# Patient Record
Sex: FEMALE | Race: WHITE | NOT HISPANIC OR LATINO | Employment: OTHER | URBAN - METROPOLITAN AREA
[De-identification: names, ages, dates, MRNs, and addresses within clinical notes are randomized per-mention and may not be internally consistent; named-entity substitution may affect disease eponyms.]

---

## 2019-07-07 ENCOUNTER — APPOINTMENT (OUTPATIENT)
Dept: RADIOLOGY | Facility: MEDICAL CENTER | Age: 84
DRG: 602 | End: 2019-07-07
Attending: INTERNAL MEDICINE
Payer: MEDICARE

## 2019-07-07 ENCOUNTER — HOSPITAL ENCOUNTER (INPATIENT)
Facility: MEDICAL CENTER | Age: 84
LOS: 9 days | DRG: 602 | End: 2019-07-16
Attending: EMERGENCY MEDICINE | Admitting: INTERNAL MEDICINE
Payer: MEDICARE

## 2019-07-07 ENCOUNTER — APPOINTMENT (OUTPATIENT)
Dept: RADIOLOGY | Facility: MEDICAL CENTER | Age: 84
DRG: 602 | End: 2019-07-07
Attending: EMERGENCY MEDICINE
Payer: MEDICARE

## 2019-07-07 DIAGNOSIS — L97.329 CHRONIC ULCER OF LEFT ANKLE, UNSPECIFIED ULCER STAGE (HCC): ICD-10-CM

## 2019-07-07 DIAGNOSIS — I10 ESSENTIAL HYPERTENSION: ICD-10-CM

## 2019-07-07 DIAGNOSIS — I73.9 PAD (PERIPHERAL ARTERY DISEASE) (HCC): ICD-10-CM

## 2019-07-07 DIAGNOSIS — I89.1 LYMPHANGITIS: ICD-10-CM

## 2019-07-07 DIAGNOSIS — L03.116 CELLULITIS OF LEFT LOWER EXTREMITY: ICD-10-CM

## 2019-07-07 PROBLEM — L97.309 CHRONIC ULCER OF ANKLE (HCC): Status: ACTIVE | Noted: 2019-07-07

## 2019-07-07 LAB
ALBUMIN SERPL BCP-MCNC: 3.1 G/DL (ref 3.2–4.9)
ALBUMIN/GLOB SERPL: 1 G/DL
ALP SERPL-CCNC: 106 U/L (ref 30–99)
ALT SERPL-CCNC: 15 U/L (ref 2–50)
ANION GAP SERPL CALC-SCNC: 8 MMOL/L (ref 0–11.9)
APPEARANCE UR: CLEAR
APTT PPP: 36.6 SEC (ref 24.7–36)
AST SERPL-CCNC: 19 U/L (ref 12–45)
BACTERIA #/AREA URNS HPF: NEGATIVE /HPF
BASOPHILS # BLD AUTO: 0.3 % (ref 0–1.8)
BASOPHILS # BLD: 0.02 K/UL (ref 0–0.12)
BILIRUB SERPL-MCNC: 0.6 MG/DL (ref 0.1–1.5)
BILIRUB UR QL STRIP.AUTO: NEGATIVE
BUN SERPL-MCNC: 28 MG/DL (ref 8–22)
CALCIUM SERPL-MCNC: 9.1 MG/DL (ref 8.5–10.5)
CHLORIDE SERPL-SCNC: 101 MMOL/L (ref 96–112)
CO2 SERPL-SCNC: 23 MMOL/L (ref 20–33)
COLOR UR: YELLOW
CREAT SERPL-MCNC: 1.13 MG/DL (ref 0.5–1.4)
EKG IMPRESSION: NORMAL
EOSINOPHIL # BLD AUTO: 0.01 K/UL (ref 0–0.51)
EOSINOPHIL NFR BLD: 0.1 % (ref 0–6.9)
EPI CELLS #/AREA URNS HPF: NEGATIVE /HPF
ERYTHROCYTE [DISTWIDTH] IN BLOOD BY AUTOMATED COUNT: 43.1 FL (ref 35.9–50)
GLOBULIN SER CALC-MCNC: 3 G/DL (ref 1.9–3.5)
GLUCOSE SERPL-MCNC: 89 MG/DL (ref 65–99)
GLUCOSE UR STRIP.AUTO-MCNC: NEGATIVE MG/DL
GRAN CASTS #/AREA URNS LPF: ABNORMAL /LPF
HCT VFR BLD AUTO: 35.2 % (ref 37–47)
HGB BLD-MCNC: 11.5 G/DL (ref 12–16)
HYALINE CASTS #/AREA URNS LPF: ABNORMAL /LPF
IMM GRANULOCYTES # BLD AUTO: 0.04 K/UL (ref 0–0.11)
IMM GRANULOCYTES NFR BLD AUTO: 0.5 % (ref 0–0.9)
INR PPP: 1 (ref 0.87–1.13)
KETONES UR STRIP.AUTO-MCNC: NEGATIVE MG/DL
LACTATE BLD-SCNC: 1 MMOL/L (ref 0.5–2)
LACTATE BLD-SCNC: 1.4 MMOL/L (ref 0.5–2)
LACTATE BLD-SCNC: 1.5 MMOL/L (ref 0.5–2)
LEUKOCYTE ESTERASE UR QL STRIP.AUTO: ABNORMAL
LYMPHOCYTES # BLD AUTO: 0.94 K/UL (ref 1–4.8)
LYMPHOCYTES NFR BLD: 11.9 % (ref 22–41)
MCH RBC QN AUTO: 26.5 PG (ref 27–33)
MCHC RBC AUTO-ENTMCNC: 32.7 G/DL (ref 33.6–35)
MCV RBC AUTO: 81.1 FL (ref 81.4–97.8)
MICRO URNS: ABNORMAL
MONOCYTES # BLD AUTO: 0.55 K/UL (ref 0–0.85)
MONOCYTES NFR BLD AUTO: 7 % (ref 0–13.4)
NEUTROPHILS # BLD AUTO: 6.33 K/UL (ref 2–7.15)
NEUTROPHILS NFR BLD: 80.2 % (ref 44–72)
NITRITE UR QL STRIP.AUTO: NEGATIVE
NRBC # BLD AUTO: 0 K/UL
NRBC BLD-RTO: 0 /100 WBC
PH UR STRIP.AUTO: 5.5 [PH]
PLATELET # BLD AUTO: 177 K/UL (ref 164–446)
PMV BLD AUTO: 10.2 FL (ref 9–12.9)
POTASSIUM SERPL-SCNC: 3.4 MMOL/L (ref 3.6–5.5)
PROT SERPL-MCNC: 6.1 G/DL (ref 6–8.2)
PROT UR QL STRIP: 30 MG/DL
PROTHROMBIN TIME: 13.4 SEC (ref 12–14.6)
RBC # BLD AUTO: 4.34 M/UL (ref 4.2–5.4)
RBC # URNS HPF: ABNORMAL /HPF
RBC UR QL AUTO: ABNORMAL
RENAL EPI CELLS #/AREA URNS HPF: ABNORMAL /HPF
SODIUM SERPL-SCNC: 132 MMOL/L (ref 135–145)
SP GR UR STRIP.AUTO: 1.02
TROPONIN I SERPL-MCNC: 0.03 NG/ML (ref 0–0.04)
UROBILINOGEN UR STRIP.AUTO-MCNC: 0.2 MG/DL
WBC # BLD AUTO: 7.9 K/UL (ref 4.8–10.8)
WBC #/AREA URNS HPF: ABNORMAL /HPF

## 2019-07-07 PROCEDURE — 71045 X-RAY EXAM CHEST 1 VIEW: CPT

## 2019-07-07 PROCEDURE — 96367 TX/PROPH/DG ADDL SEQ IV INF: CPT

## 2019-07-07 PROCEDURE — 81001 URINALYSIS AUTO W/SCOPE: CPT

## 2019-07-07 PROCEDURE — 85025 COMPLETE CBC W/AUTO DIFF WBC: CPT

## 2019-07-07 PROCEDURE — 83605 ASSAY OF LACTIC ACID: CPT | Mod: 91

## 2019-07-07 PROCEDURE — 93005 ELECTROCARDIOGRAM TRACING: CPT | Performed by: EMERGENCY MEDICINE

## 2019-07-07 PROCEDURE — 85730 THROMBOPLASTIN TIME PARTIAL: CPT

## 2019-07-07 PROCEDURE — 96365 THER/PROPH/DIAG IV INF INIT: CPT

## 2019-07-07 PROCEDURE — 770021 HCHG ROOM/CARE - ISO PRIVATE

## 2019-07-07 PROCEDURE — 700105 HCHG RX REV CODE 258: Performed by: EMERGENCY MEDICINE

## 2019-07-07 PROCEDURE — 700111 HCHG RX REV CODE 636 W/ 250 OVERRIDE (IP): Performed by: EMERGENCY MEDICINE

## 2019-07-07 PROCEDURE — 87040 BLOOD CULTURE FOR BACTERIA: CPT

## 2019-07-07 PROCEDURE — 96366 THER/PROPH/DIAG IV INF ADDON: CPT

## 2019-07-07 PROCEDURE — 99223 1ST HOSP IP/OBS HIGH 75: CPT | Mod: AI | Performed by: INTERNAL MEDICINE

## 2019-07-07 PROCEDURE — 87086 URINE CULTURE/COLONY COUNT: CPT

## 2019-07-07 PROCEDURE — 93922 UPR/L XTREMITY ART 2 LEVELS: CPT

## 2019-07-07 PROCEDURE — 99285 EMERGENCY DEPT VISIT HI MDM: CPT

## 2019-07-07 PROCEDURE — 85610 PROTHROMBIN TIME: CPT

## 2019-07-07 PROCEDURE — 770006 HCHG ROOM/CARE - MED/SURG/GYN SEMI*

## 2019-07-07 PROCEDURE — 36415 COLL VENOUS BLD VENIPUNCTURE: CPT

## 2019-07-07 PROCEDURE — 700105 HCHG RX REV CODE 258: Performed by: INTERNAL MEDICINE

## 2019-07-07 PROCEDURE — 80053 COMPREHEN METABOLIC PANEL: CPT

## 2019-07-07 PROCEDURE — 700102 HCHG RX REV CODE 250 W/ 637 OVERRIDE(OP): Performed by: INTERNAL MEDICINE

## 2019-07-07 PROCEDURE — 73610 X-RAY EXAM OF ANKLE: CPT | Mod: LT

## 2019-07-07 PROCEDURE — 84484 ASSAY OF TROPONIN QUANT: CPT

## 2019-07-07 PROCEDURE — 700111 HCHG RX REV CODE 636 W/ 250 OVERRIDE (IP): Performed by: INTERNAL MEDICINE

## 2019-07-07 PROCEDURE — 93925 LOWER EXTREMITY STUDY: CPT

## 2019-07-07 PROCEDURE — A9270 NON-COVERED ITEM OR SERVICE: HCPCS | Performed by: INTERNAL MEDICINE

## 2019-07-07 RX ORDER — AMLODIPINE BESYLATE 10 MG/1
10 TABLET ORAL DAILY
Status: DISCONTINUED | OUTPATIENT
Start: 2019-07-07 | End: 2019-07-16 | Stop reason: HOSPADM

## 2019-07-07 RX ORDER — SODIUM CHLORIDE 9 MG/ML
INJECTION, SOLUTION INTRAVENOUS CONTINUOUS
Status: DISCONTINUED | OUTPATIENT
Start: 2019-07-07 | End: 2019-07-09

## 2019-07-07 RX ORDER — AMOXICILLIN 250 MG
2 CAPSULE ORAL 2 TIMES DAILY
Status: DISCONTINUED | OUTPATIENT
Start: 2019-07-07 | End: 2019-07-16 | Stop reason: HOSPADM

## 2019-07-07 RX ORDER — LACTOBACILLUS RHAMNOSUS GG 10B CELL
1 CAPSULE ORAL
Status: DISCONTINUED | OUTPATIENT
Start: 2019-07-08 | End: 2019-07-16 | Stop reason: HOSPADM

## 2019-07-07 RX ORDER — OXYCODONE HYDROCHLORIDE 5 MG/1
5 TABLET ORAL
Status: DISCONTINUED | OUTPATIENT
Start: 2019-07-07 | End: 2019-07-10

## 2019-07-07 RX ORDER — BISACODYL 10 MG
10 SUPPOSITORY, RECTAL RECTAL
Status: DISCONTINUED | OUTPATIENT
Start: 2019-07-07 | End: 2019-07-16 | Stop reason: HOSPADM

## 2019-07-07 RX ORDER — CLOPIDOGREL BISULFATE 75 MG/1
75 TABLET ORAL DAILY
COMMUNITY

## 2019-07-07 RX ORDER — CLOPIDOGREL BISULFATE 75 MG/1
75 TABLET ORAL DAILY
Status: DISCONTINUED | OUTPATIENT
Start: 2019-07-07 | End: 2019-07-16 | Stop reason: HOSPADM

## 2019-07-07 RX ORDER — HYDROXYZINE HYDROCHLORIDE 25 MG/1
25 TABLET, FILM COATED ORAL 3 TIMES DAILY PRN
COMMUNITY
End: 2019-07-07

## 2019-07-07 RX ORDER — SODIUM CHLORIDE 9 MG/ML
500 INJECTION, SOLUTION INTRAVENOUS
Status: DISCONTINUED | OUTPATIENT
Start: 2019-07-07 | End: 2019-07-16 | Stop reason: HOSPADM

## 2019-07-07 RX ORDER — HEPARIN SODIUM 5000 [USP'U]/ML
5000 INJECTION, SOLUTION INTRAVENOUS; SUBCUTANEOUS EVERY 8 HOURS
Status: DISCONTINUED | OUTPATIENT
Start: 2019-07-07 | End: 2019-07-10

## 2019-07-07 RX ORDER — OXYCODONE HYDROCHLORIDE 5 MG/1
2.5 TABLET ORAL
Status: DISCONTINUED | OUTPATIENT
Start: 2019-07-07 | End: 2019-07-10

## 2019-07-07 RX ORDER — AMLODIPINE BESYLATE 10 MG/1
10 TABLET ORAL DAILY
COMMUNITY

## 2019-07-07 RX ORDER — ACETAMINOPHEN 325 MG/1
650 TABLET ORAL EVERY 6 HOURS PRN
Status: DISCONTINUED | OUTPATIENT
Start: 2019-07-07 | End: 2019-07-16 | Stop reason: HOSPADM

## 2019-07-07 RX ORDER — HYDROMORPHONE HYDROCHLORIDE 1 MG/ML
0.25 INJECTION, SOLUTION INTRAMUSCULAR; INTRAVENOUS; SUBCUTANEOUS
Status: DISCONTINUED | OUTPATIENT
Start: 2019-07-07 | End: 2019-07-10

## 2019-07-07 RX ORDER — POLYETHYLENE GLYCOL 3350 17 G/17G
1 POWDER, FOR SOLUTION ORAL
Status: DISCONTINUED | OUTPATIENT
Start: 2019-07-07 | End: 2019-07-16 | Stop reason: HOSPADM

## 2019-07-07 RX ADMIN — HEPARIN SODIUM 5000 UNITS: 5000 INJECTION, SOLUTION INTRAVENOUS; SUBCUTANEOUS at 17:50

## 2019-07-07 RX ADMIN — AMPICILLIN AND SULBACTAM 3 G: 1; 2 INJECTION, POWDER, FOR SOLUTION INTRAMUSCULAR; INTRAVENOUS at 21:46

## 2019-07-07 RX ADMIN — VANCOMYCIN HYDROCHLORIDE 1500 MG: 500 INJECTION, POWDER, LYOPHILIZED, FOR SOLUTION INTRAVENOUS at 13:55

## 2019-07-07 RX ADMIN — AMLODIPINE BESYLATE 10 MG: 10 TABLET ORAL at 17:50

## 2019-07-07 RX ADMIN — AMPICILLIN AND SULBACTAM 3 G: 1; 2 INJECTION, POWDER, FOR SOLUTION INTRAMUSCULAR; INTRAVENOUS at 13:20

## 2019-07-07 RX ADMIN — SODIUM CHLORIDE: 9 INJECTION, SOLUTION INTRAVENOUS at 17:51

## 2019-07-07 RX ADMIN — CLOPIDOGREL BISULFATE 75 MG: 75 TABLET ORAL at 17:50

## 2019-07-07 ASSESSMENT — ENCOUNTER SYMPTOMS
SHORTNESS OF BREATH: 0
EYE REDNESS: 0
BLOOD IN STOOL: 0
FEVER: 1
PALPITATIONS: 0
MYALGIAS: 1
VOMITING: 0
NERVOUS/ANXIOUS: 0
LOSS OF CONSCIOUSNESS: 0
WEAKNESS: 1
NAUSEA: 0
HEMOPTYSIS: 0
SEIZURES: 0
TREMORS: 0
FALLS: 0
FOCAL WEAKNESS: 0
DIARRHEA: 0
COUGH: 0
WHEEZING: 0
HEADACHES: 0
CHILLS: 1
DIZZINESS: 0
EYE PAIN: 0
CONSTIPATION: 0
ABDOMINAL PAIN: 0
INSOMNIA: 0

## 2019-07-07 ASSESSMENT — COPD QUESTIONNAIRES
HAVE YOU SMOKED AT LEAST 100 CIGARETTES IN YOUR ENTIRE LIFE: NO/DON'T KNOW
IN THE PAST 12 MONTHS DO YOU DO LESS THAN YOU USED TO BECAUSE OF YOUR BREATHING PROBLEMS: DISAGREE/UNSURE
COPD SCREENING SCORE: 2
DURING THE PAST 4 WEEKS HOW MUCH DID YOU FEEL SHORT OF BREATH: NONE/LITTLE OF THE TIME
DO YOU EVER COUGH UP ANY MUCUS OR PHLEGM?: NO/ONLY WITH OCCASIONAL COLDS OR INFECTIONS

## 2019-07-07 ASSESSMENT — LIFESTYLE VARIABLES
EVER_SMOKED: NEVER
ALCOHOL_USE: NO

## 2019-07-07 ASSESSMENT — PATIENT HEALTH QUESTIONNAIRE - PHQ9
2. FEELING DOWN, DEPRESSED, IRRITABLE, OR HOPELESS: NOT AT ALL
SUM OF ALL RESPONSES TO PHQ9 QUESTIONS 1 AND 2: 0
1. LITTLE INTEREST OR PLEASURE IN DOING THINGS: NOT AT ALL

## 2019-07-07 NOTE — ED TRIAGE NOTES
Pt BIB [place], transfer from [facility] in [place], c/o wound infection to bilat ankles, redness and swelling spreading up left leg to left thigh, pt reports increasing pain and increasing fatigue, pt arrives awake, alert, appropriate, NAD, HR and O2 sat WNL, BP 94/45, pt in gown, on monitor, chart up for ERP

## 2019-07-07 NOTE — ED NOTES
Pt assisted to bed pan, pt unable to urinate at this time, pt encouraged to call when ready to urinate, pt boosted up in bed in bed, knees elevated, no other needs at this time

## 2019-07-07 NOTE — PROGRESS NOTES
"Pharmacy Kinetics 85 y.o. female on vancomycin day # 1 2019    Received Vancomycin 1,500 mg IV loading dose at 13:55 PM    Indication for Treatment: SSTI of LLE     Pertinent history per medical record: Admitted on 2019 for cellulitis of the left lower extremity.     Shantelle Mata is an 85 y.o. female who presented to the ER with erythema, swelling and pain in her left leg. She was transferred from an OSH in [place], were she received IV clindamycin and azithromycin PTA. The patient reported chills and a fever.     Other antibiotics: ampicillin-sulbactam 3g IV Q12h (renally-dosed)    Allergies: Cephalexin [keflex]; Sulfa drugs; and Sulfamethoxazole w-trimethoprim     List concerns for renal function: age, low albumin, BUN/SCr ratio > 20:1    Pertinent cultures to date:   19: Blood culture - in process  19: Blood culture - in process    MRSA nares swab if pneumonia is a concern: n-a    Recent Labs      19   1255   WBC  7.9   NEUTSPOLYS  80.20*     Recent Labs      19   1255   BUN  28*   CREATININE  1.13   ALBUMIN  3.1*     /46   Pulse 69   Temp 36.9 °C (98.4 °F) (Temporal)   Resp (!) 22   Ht 1.575 m (5' 2\")   Wt 58.5 kg (129 lb)   SpO2 96%  Temp (24hrs), Av.9 °C (98.4 °F), Min:36.9 °C (98.4 °F), Max:36.9 °C (98.4 °F)    A/P   1. Vancomycin dose change: Pulse dosing  2. Next vancomycin level: Vancomycin 22-hour random level ordered for tomorrow at noon   3. Goal trough: 12-16 mcg/mL  4. Comments: Broad spectrum antibiotics initiated for SSTI. Concerns for renal accumulation of vancomycin listed above. A vancomycin level will be obtained ~22h post loading dose (25 mg/kg). Pharmacy will continue to follow and recommend de-escalation of antibiotics as appropriate.     Mena Wiseman, PharmD, BCPS        "

## 2019-07-07 NOTE — ED PROVIDER NOTES
ED Provider Note    CHIEF COMPLAINT  Chief Complaint   Patient presents with   • Wound Infection       HPI  Shantelle Mata is a 85 y.o. female who presents to the emergency department complaining of pain swelling redness in her left leg.  The patient had a painful red rash in her left leg.  This is been progressive over the last couple of days.  She went to hospital she was diagnosed with cellulitis lymphangitis and given IV clindamycin azithromycin she was transferred here.  Patient planes of pain in her leg.  No numbness to the.  No falls.  She has a little bit of discomfort less of her chest.  Denies any cough.  Has had some fever chills and feel systemically ill.  Pain is moderate in severity.  Nothing makes it better nothing makes it worse.  Denies any other acute concerns or complaints.    REVIEW OF SYSTEMS  See HPI for further details. All other systems are negative.    PAST MEDICAL HISTORY  Past Medical History:   Diagnosis Date   • Peripheral vascular disease (HCC)        FAMILY HISTORY  History reviewed. No pertinent family history.    SOCIAL HISTORY  Social History     Social History   • Marital status: N/A     Spouse name: N/A   • Number of children: N/A   • Years of education: N/A     Social History Main Topics   • Smoking status: Never Smoker   • Smokeless tobacco: Never Used   • Alcohol use No   • Drug use: No   • Sexual activity: Not on file     Other Topics Concern   • Not on file     Social History Narrative   • No narrative on file       SURGICAL HISTORY  Past Surgical History:   Procedure Laterality Date   • OTHER CARDIAC SURGERY         CURRENT MEDICATIONS  Home Medications     Reviewed by Jessica Boyce R.N. (Registered Nurse) on 07/07/19 at 1212  Med List Status: Partial   Medication Last Dose Status   amLODIPine (NORVASC) 10 MG Tab 7/5/19 Active   clopidogrel (PLAVIX) 75 MG Tab 7/5/19 Active   hydrOXYzine HCl (ATARAX) 25 MG Tab  Active                ALLERGIES  Allergies   Allergen Reactions  "  • Bactrim [Sulfamethoxazole W-Trimethoprim]      Pt unsure of reaction    • Keflex      Pt unsure of reaction      • Sulfa Drugs      Pt unsure of reaction        PHYSICAL EXAM  VITAL SIGNS: /46   Pulse 79   Temp 36.9 °C (98.4 °F) (Temporal)   Resp 17   Ht 1.575 m (5' 2\")   Wt 58.5 kg (129 lb)   BMI 23.59 kg/m²    Constitutional: Awake alert nontoxic.  Mild distress  HENT: Normocephalic, Atraumatic, Bilateral external ears normal, Oropharynx moist, No oral exudates, Nose normal.   Eyes: PERRL, EOMI, Conjunctiva normal, No discharge.   Neck: Normal range of motion, No tenderness, Supple, No stridor.     Cardiovascular: Normal heart rate, Normal rhythm, No murmurs, No rubs, No gallops.   Thorax & Lungs: Normal breath sounds, No respiratory distress, No wheezing,   Abdomen: Bowel sounds normal, Soft, No tenderness  Skin: Warm, Dry, No erythema, No rash.     Musculoskeletal: Good range of motion in all major joints.  Left leg is erythematous.  This is patchy and spreads of the medial and anterior side of her leg throughout the lymphatic pathways.  It is red and hot.  Her sensation movement is intact distally.  There is an ulceration of the leg distally.  Neurologic: Alert & oriented x 3, Normal motor function, Normal sensory function, No focal deficits noted.   Psychiatric: Affect normal.     Results for orders placed or performed during the hospital encounter of 07/07/19   LACTIC ACID   Result Value Ref Range    Lactic Acid 1.5 0.5 - 2.0 mmol/L   CBC WITH DIFFERENTIAL   Result Value Ref Range    WBC 7.9 4.8 - 10.8 K/uL    RBC 4.34 4.20 - 5.40 M/uL    Hemoglobin 11.5 (L) 12.0 - 16.0 g/dL    Hematocrit 35.2 (L) 37.0 - 47.0 %    MCV 81.1 (L) 81.4 - 97.8 fL    MCH 26.5 (L) 27.0 - 33.0 pg    MCHC 32.7 (L) 33.6 - 35.0 g/dL    RDW 43.1 35.9 - 50.0 fL    Platelet Count 177 164 - 446 K/uL    MPV 10.2 9.0 - 12.9 fL    Neutrophils-Polys 80.20 (H) 44.00 - 72.00 %    Lymphocytes 11.90 (L) 22.00 - 41.00 %    Monocytes " 7.00 0.00 - 13.40 %    Eosinophils 0.10 0.00 - 6.90 %    Basophils 0.30 0.00 - 1.80 %    Immature Granulocytes 0.50 0.00 - 0.90 %    Nucleated RBC 0.00 /100 WBC    Neutrophils (Absolute) 6.33 2.00 - 7.15 K/uL    Lymphs (Absolute) 0.94 (L) 1.00 - 4.80 K/uL    Monos (Absolute) 0.55 0.00 - 0.85 K/uL    Eos (Absolute) 0.01 0.00 - 0.51 K/uL    Baso (Absolute) 0.02 0.00 - 0.12 K/uL    Immature Granulocytes (abs) 0.04 0.00 - 0.11 K/uL    NRBC (Absolute) 0.00 K/uL   COMP METABOLIC PANEL   Result Value Ref Range    Sodium 132 (L) 135 - 145 mmol/L    Potassium 3.4 (L) 3.6 - 5.5 mmol/L    Chloride 101 96 - 112 mmol/L    Co2 23 20 - 33 mmol/L    Anion Gap 8.0 0.0 - 11.9    Glucose 89 65 - 99 mg/dL    Bun 28 (H) 8 - 22 mg/dL    Creatinine 1.13 0.50 - 1.40 mg/dL    Calcium 9.1 8.5 - 10.5 mg/dL    AST(SGOT) 19 12 - 45 U/L    ALT(SGPT) 15 2 - 50 U/L    Alkaline Phosphatase 106 (H) 30 - 99 U/L    Total Bilirubin 0.6 0.1 - 1.5 mg/dL    Albumin 3.1 (L) 3.2 - 4.9 g/dL    Total Protein 6.1 6.0 - 8.2 g/dL    Globulin 3.0 1.9 - 3.5 g/dL    A-G Ratio 1.0 g/dL   ESTIMATED GFR   Result Value Ref Range    GFR If  55 (A) >60 mL/min/1.73 m 2    GFR If Non  46 (A) >60 mL/min/1.73 m 2   EKG (NOW)   Result Value Ref Range    Report       [facility] Emergency Dept.    Test Date:  2019  Pt Name:    LJ WOOD                Department: ER  MRN:        [id number]                      Room:       [facility]  Gender:     Female                       Technician: [id number]  :        [id number]                   Requested By:YASEMIN Lyles #:    [id number]                    Reading MD:    Measurements  Intervals                                Axis  Rate:       80                           P:          47  UT:         180                          QRS:        4  QRSD:       96                           T:          36  QT:         388  QTc:        448    Interpretive Statements  SINUS RHYTHM  ATRIAL  PREMATURE COMPLEX  SINUS PAUSE/ARREST WITH ATRIAL ESCAPE  No previous ECG available for comparison          RADIOLOGY/PROCEDURES  None    COURSE & MEDICAL DECISION MAKING  Pertinent Labs & Imaging studies reviewed. (See chart for details)  Patient is worked up per the sepsis protocol.    I have reviewed the records, physician's notes, labs and medications administered in the transferring hospital.      Leg is red hot swollen and it tracks up the lymphatic pathways this consistent with cellulitis and lymphangitis .    Per report she has a history of for peripheral arterial disease.  There is no evidence of DVT or PE at this time.  There is no evidence of acute limb ischemia as result of arterial occlusion.  Her leg is hot to the touch is intact movement and sensation.      She is cellulitis given the patchy bright red distribution concerned about strep.  I have started her on appropriate antibiotics per the pharmacy protocol.  Discussed with the pharmacist.  The patient be admitted for further work-up and treatment.      Because of her age deformities and the extensive nature of the cellulitis is not appropriate for outpatient management.    FINAL IMPRESSION  1. Cellulitis of left lower extremity    2. Lymphangitis    3. PAD (peripheral artery disease) ([facility])               Electronically signed by: Alex Doty, 7/7/2019 12:49 PM

## 2019-07-07 NOTE — H&P
"Hospital Medicine History & Physical Note    Date of Service  7/7/2019    Primary Care Physician  No primary care provider on file.    Consultants  Orthopedics  Vascular    Code Status  full    Chief Complaint  Wound Infection        History of Presenting Illness  85 y.o. female who presented 7/7/2019 with Wound Infection  History of PVD and \"stents in the groin\" followed by Dr. Walton, Vascular Surgery in [place].  Patient self reported history of MRSA infection.     2d history of rising erythema from L chronic ulcer, with fevers, chills, pain.  No other complaints. History limited because of ongoing tenderness.   At the ED, she is afebrile and normotensive.  No leukocytosis, normal lactic acid.  When I saw her at the ED, no acute distress. Touching her leg however she would be in extreme pain., she can barely lift her leg. L ankle is tight with limited ROM.   I consulted Vascular and Orthopedics.    Review of Systems  Review of Systems   Constitutional: Positive for chills, fever and malaise/fatigue.   HENT: Negative for congestion, hearing loss and nosebleeds.    Eyes: Negative for pain and redness.   Respiratory: Negative for cough, hemoptysis, shortness of breath and wheezing.    Cardiovascular: Negative for chest pain and palpitations.   Gastrointestinal: Negative for abdominal pain, blood in stool, constipation, diarrhea, nausea and vomiting.   Genitourinary: Negative for dysuria, frequency and hematuria.   Musculoskeletal: Positive for joint pain and myalgias. Negative for falls.   Skin: Negative for rash.   Neurological: Positive for weakness. Negative for dizziness, tremors, focal weakness, seizures, loss of consciousness and headaches.   Psychiatric/Behavioral: The patient is not nervous/anxious and does not have insomnia.    All other systems reviewed and are negative.      Past Medical History   has a past medical history of Peripheral vascular disease (HCC).    Surgical History   has a past surgical " history that includes other cardiac surgery.     Family History  family history is not on file.   Mother had PVD  Father had heart disease  Social History   reports that she has never smoked. She has never used smokeless tobacco. She reports that she does not drink alcohol or use drugs.    Allergies  Allergies   Allergen Reactions   • Cephalexin [Keflex]      Pt unsure of reaction      • Sulfa Drugs      Pt unsure of reaction    • Sulfamethoxazole W-Trimethoprim      Pt unsure of reaction        Medications  Prior to Admission Medications   Prescriptions Last Dose Informant Patient Reported? Taking?   amLODIPine (NORVASC) 10 MG Tab 7/5/19 at unknown  Yes Yes   Sig: Take 10 mg by mouth every day.   clopidogrel (PLAVIX) 75 MG Tab 7/5/19 at unknown  Yes Yes   Sig: Take 75 mg by mouth every day.      Facility-Administered Medications: None       Physical Exam  Temp:  [36.9 °C (98.4 °F)] 36.9 °C (98.4 °F)  Pulse:  [64-79] 69  Resp:  [17-26] 22  BP: (109)/(46) 109/46  SpO2:  [92 %-98 %] 96 %    Physical Exam   Constitutional: She appears well-developed.   Older appearing, thin   HENT:   Head: Normocephalic and atraumatic.   Eyes: Conjunctivae are normal. No scleral icterus.   Neck: Normal range of motion. Neck supple.   Cardiovascular: Normal rate and regular rhythm.  Exam reveals no gallop and no friction rub.    No murmur heard.  Pulmonary/Chest: Effort normal and breath sounds normal. No respiratory distress. She has no wheezes. She has no rales.   Abdominal: Soft. Bowel sounds are normal. She exhibits no distension. There is no tenderness. There is no rebound and no guarding.   Musculoskeletal: She exhibits edema (L leg) and tenderness (L leg, severe; L ankle).   L ankle limited ROM, tight  R leg bandages CDI   Neurological: She is alert.   Skin: Skin is warm. Rash (L leg) noted.   Psychiatric: She has a normal mood and affect. Her behavior is normal.       Laboratory:  Recent Labs      07/07/19   1255   WBC  7.9    RBC  4.34   HEMOGLOBIN  11.5*   HEMATOCRIT  35.2*   MCV  81.1*   MCH  26.5*   MCHC  32.7*   RDW  43.1   PLATELETCT  177   MPV  10.2     Recent Labs      07/07/19   1255   SODIUM  132*   POTASSIUM  3.4*   CHLORIDE  101   CO2  23   GLUCOSE  89   BUN  28*   CREATININE  1.13   CALCIUM  9.1     Recent Labs      07/07/19   1255   ALTSGPT  15   ASTSGOT  19   ALKPHOSPHAT  106*   TBILIRUBIN  0.6   GLUCOSE  89     Recent Labs      07/07/19   1255   APTT  36.6*   INR  1.00             Recent Labs      07/07/19   1255   TROPONINI  0.03       Urinalysis:    No results found     Imaging:  US-MARY SINGLE LEVEL BILAT   Final Result      US-EXTREMITY ARTERY LOWER BILAT W/MARY (COMBO)   Final Result      DX-ANKLE 3+ VIEWS LEFT   Final Result      No definite acute osseous abnormality but the evaluation limited due to osseous demineralization.      Diffuse soft tissue swelling.      DX-CHEST-PORTABLE (1 VIEW)   Final Result         1. No acute cardiopulmonary abnormalities are identified.      US-EXTREMITY ARTERY LOWER BILAT    (Results Pending)         Assessment/Plan:  I anticipate this patient will require at least two midnights for appropriate medical management, necessitating inpatient admission.    * Cellulitis of left lower extremity   Assessment & Plan    No leukocytosis. Lactic acid 1.5  Diffuse erythema L leg and severe/exquisite pain pain however, concern for early nec fasciitis  Tightness around ankle area, she is unable to move the ankle, limited ROM; concern for ankle involvement, and compartment syndrome  Ordered Xray of left ankle  Dopplerable pulses.   Holding off MRI for now. Consulted Orthopedics who will take a look  Ordered IV antibiotics, pain control and bowel regimen.       PVD (peripheral vascular disease) (HCC)   Assessment & Plan    Followed in [place] for bilateral leg PVDs. No records here.  Chronic ulcers as a result.         VTE prophylaxis: Heparin SQ  Reviewed vitals, labs, imaging, staff  notes.  Discussed assessment and plan with Shantelle Mata  Discussed with ED physician, Orthopedics, Dr. Heaton, Vascular.

## 2019-07-07 NOTE — CONSULTS
DATE OF CONSULTATION: 7/7/2019     REFERRING PHYSICIAN: MD NEY.     CONSULTING PHYSICIAN: Red Heaton M.D.      REASON FOR CONSULTATION: PAD and leg ulcer     HISTORY OF PRESENT ILLNESS: The patient is a 86 yo female who presents with increased pain and erythema left leg.  Reports a chronic ulceration on the medial malleolus of her left ankle. Reports that over last 2 days has developed significant pain in left leg associated with increased erythema.   MARY performed demonstrate MARY right 0.8 and left 0.5.  Moderate PAD       PAST MEDICAL HISTORY:  has a past medical history of Peripheral vascular disease (HCC).     PAST SURGICAL HISTORY:  has a past surgical history that includes other cardiac surgery.     ALLERGIES:   Allergies   Allergen Reactions   • Cephalexin [Keflex]      Pt unsure of reaction      • Sulfa Drugs      Pt unsure of reaction    • Sulfamethoxazole W-Trimethoprim      Pt unsure of reaction         CURRENT MEDICATIONS:   Home Medications     Reviewed by Rigo Nunez (Pharmacy Tech) on 07/07/19 at 1307  Med List Status: Complete   Medication Last Dose Status   amLODIPine (NORVASC) 10 MG Tab 7/5/19 Active   clopidogrel (PLAVIX) 75 MG Tab 7/5/19 Active                FAMILY HISTORY: History reviewed. No pertinent family history.     SOCIAL HISTORY:   Social History     Social History Main Topics   • Smoking status: Never Smoker   • Smokeless tobacco: Never Used   • Alcohol use No   • Drug use: No   • Sexual activity: Not on file       Review of Systems:      Reports pain left leg     PHYSICAL EXAMINATION:       Constitutional:   Elderly  HENMT:  Normocephalic, Atraumatic, Oropharynx moist mucous membranes, No oral exudates, Nose normal.  No thyromegaly.  Eyes:  EOMI, Conjunctiva normal, No discharge.  Neck:  Normal range of motion, No cervical tenderness,  no JVD.  Cardiovascular:  Normal heart rate, Normal rhythm, No murmurs, No rubs, No gallops.     Extremitites - doppler signals present  BLE, erythema left leg up to thigh c/w cellulitis   Lungs:  Normal breath sounds, breath sounds clear to auscultation bilaterally,  no crackles, no wheezing.   Abdomen: Bowel sounds normal, Soft, No tenderness, No guarding, No rebound, No masses, No hepatosplenomegaly.      LABORATORY VALUES:   Recent Labs      07/07/19   1255   WBC  7.9   RBC  4.34   HEMOGLOBIN  11.5*   HEMATOCRIT  35.2*   MCV  81.1*   MCH  26.5*   MCHC  32.7*   RDW  43.1   PLATELETCT  177   MPV  10.2     Recent Labs      07/07/19   1255   SODIUM  132*   POTASSIUM  3.4*   CHLORIDE  101   CO2  23   GLUCOSE  89   BUN  28*   CREATININE  1.13   CALCIUM  9.1     Recent Labs      07/07/19   1255   ASTSGOT  19   ALTSGPT  15   TBILIRUBIN  0.6   ALKPHOSPHAT  106*   GLOBULIN  3.0   INR  1.00     Recent Labs      07/07/19   1255   APTT  36.6*   INR  1.00        IMAGING:   DX-CHEST-PORTABLE (1 VIEW)   Final Result         1. No acute cardiopulmonary abnormalities are identified.      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 Moderate chronic PAD - no suggestion of acute ischemia   2. Cellulitis left leg   3. Probable chronic venous stasis ulcer vs decubitus ulcer left ankle     Recommend - Aggressive IV abx therapy, orthopedics has been consulted for possible necrotizing fasciitis.   Perfusion to leg adequate with only moderate PAD detected   No indication for emergent vascular intervention.   Follow       ____________________________________   Red Heaton M.D.          DD: 7/7/2019   DT: 3:46 PM

## 2019-07-07 NOTE — ASSESSMENT & PLAN NOTE
Has moderate PAD.  Evaluated by Dr. Heaton of vascular surgery who was recommended against any type of surgical intervention.  Wound care consult for chronic ulcers.  plavix daily.

## 2019-07-07 NOTE — ASSESSMENT & PLAN NOTE
No leukocytosis, lactic acid remains normal.  No current concern for necrotizing fasciitis.  X-ray of the left ankle showed soft tissue swelling only.  Pulses 1+ at best.  Continue pain control.  Blood cultures negative x2  7/9 wound culture of LLE ulceration no growth.  vascular surgery has been consulted.  U/s venous negative.  Mild to moderate arterial stenosis seen on arterial u/s.  7/12 Improved on IV abx, transitioned to oral.

## 2019-07-08 LAB
ANION GAP SERPL CALC-SCNC: 9 MMOL/L (ref 0–11.9)
BUN SERPL-MCNC: 24 MG/DL (ref 8–22)
CALCIUM SERPL-MCNC: 8.6 MG/DL (ref 8.5–10.5)
CHLORIDE SERPL-SCNC: 105 MMOL/L (ref 96–112)
CO2 SERPL-SCNC: 22 MMOL/L (ref 20–33)
CREAT SERPL-MCNC: 0.91 MG/DL (ref 0.5–1.4)
ERYTHROCYTE [DISTWIDTH] IN BLOOD BY AUTOMATED COUNT: 43.2 FL (ref 35.9–50)
GLUCOSE BLD-MCNC: 99 MG/DL (ref 65–99)
GLUCOSE SERPL-MCNC: 125 MG/DL (ref 65–99)
HCT VFR BLD AUTO: 32.5 % (ref 37–47)
HGB BLD-MCNC: 10.4 G/DL (ref 12–16)
LACTATE BLD-SCNC: 1 MMOL/L (ref 0.5–2)
MCH RBC QN AUTO: 25.9 PG (ref 27–33)
MCHC RBC AUTO-ENTMCNC: 32 G/DL (ref 33.6–35)
MCV RBC AUTO: 81 FL (ref 81.4–97.8)
PLATELET # BLD AUTO: 178 K/UL (ref 164–446)
PMV BLD AUTO: 10.7 FL (ref 9–12.9)
POTASSIUM SERPL-SCNC: 3.5 MMOL/L (ref 3.6–5.5)
RBC # BLD AUTO: 4.01 M/UL (ref 4.2–5.4)
SODIUM SERPL-SCNC: 136 MMOL/L (ref 135–145)
VANCOMYCIN SERPL-MCNC: 9.3 UG/ML
WBC # BLD AUTO: 7.8 K/UL (ref 4.8–10.8)

## 2019-07-08 PROCEDURE — 97165 OT EVAL LOW COMPLEX 30 MIN: CPT

## 2019-07-08 PROCEDURE — 99232 SBSQ HOSP IP/OBS MODERATE 35: CPT | Performed by: HOSPITALIST

## 2019-07-08 PROCEDURE — 97535 SELF CARE MNGMENT TRAINING: CPT

## 2019-07-08 PROCEDURE — A9270 NON-COVERED ITEM OR SERVICE: HCPCS | Performed by: INTERNAL MEDICINE

## 2019-07-08 PROCEDURE — 770021 HCHG ROOM/CARE - ISO PRIVATE

## 2019-07-08 PROCEDURE — 82962 GLUCOSE BLOOD TEST: CPT

## 2019-07-08 PROCEDURE — 80202 ASSAY OF VANCOMYCIN: CPT

## 2019-07-08 PROCEDURE — 97161 PT EVAL LOW COMPLEX 20 MIN: CPT

## 2019-07-08 PROCEDURE — 85027 COMPLETE CBC AUTOMATED: CPT

## 2019-07-08 PROCEDURE — 700105 HCHG RX REV CODE 258: Performed by: INTERNAL MEDICINE

## 2019-07-08 PROCEDURE — 700111 HCHG RX REV CODE 636 W/ 250 OVERRIDE (IP): Performed by: INTERNAL MEDICINE

## 2019-07-08 PROCEDURE — 700111 HCHG RX REV CODE 636 W/ 250 OVERRIDE (IP): Performed by: HOSPITALIST

## 2019-07-08 PROCEDURE — 700105 HCHG RX REV CODE 258: Performed by: HOSPITALIST

## 2019-07-08 PROCEDURE — 51798 US URINE CAPACITY MEASURE: CPT

## 2019-07-08 PROCEDURE — 36415 COLL VENOUS BLD VENIPUNCTURE: CPT

## 2019-07-08 PROCEDURE — 700102 HCHG RX REV CODE 250 W/ 637 OVERRIDE(OP): Performed by: INTERNAL MEDICINE

## 2019-07-08 PROCEDURE — 80048 BASIC METABOLIC PNL TOTAL CA: CPT

## 2019-07-08 PROCEDURE — 97602 WOUND(S) CARE NON-SELECTIVE: CPT

## 2019-07-08 PROCEDURE — 83605 ASSAY OF LACTIC ACID: CPT

## 2019-07-08 RX ADMIN — SODIUM CHLORIDE: 9 INJECTION, SOLUTION INTRAVENOUS at 06:26

## 2019-07-08 RX ADMIN — Medication 1 CAPSULE: at 06:38

## 2019-07-08 RX ADMIN — AMPICILLIN AND SULBACTAM 3 G: 1; 2 INJECTION, POWDER, FOR SOLUTION INTRAMUSCULAR; INTRAVENOUS at 18:22

## 2019-07-08 RX ADMIN — CLOPIDOGREL BISULFATE 75 MG: 75 TABLET ORAL at 06:38

## 2019-07-08 RX ADMIN — AMLODIPINE BESYLATE 10 MG: 10 TABLET ORAL at 06:23

## 2019-07-08 RX ADMIN — HEPARIN SODIUM 5000 UNITS: 5000 INJECTION, SOLUTION INTRAVENOUS; SUBCUTANEOUS at 14:05

## 2019-07-08 RX ADMIN — SODIUM CHLORIDE: 9 INJECTION, SOLUTION INTRAVENOUS at 23:58

## 2019-07-08 RX ADMIN — VANCOMYCIN HYDROCHLORIDE 900 MG: 500 INJECTION, POWDER, LYOPHILIZED, FOR SOLUTION INTRAVENOUS at 14:05

## 2019-07-08 RX ADMIN — ACETAMINOPHEN 650 MG: 325 TABLET, FILM COATED ORAL at 00:16

## 2019-07-08 RX ADMIN — ACETAMINOPHEN 650 MG: 325 TABLET, FILM COATED ORAL at 17:48

## 2019-07-08 RX ADMIN — SENNOSIDES, DOCUSATE SODIUM 2 TABLET: 50; 8.6 TABLET, FILM COATED ORAL at 18:22

## 2019-07-08 RX ADMIN — HEPARIN SODIUM 5000 UNITS: 5000 INJECTION, SOLUTION INTRAVENOUS; SUBCUTANEOUS at 06:39

## 2019-07-08 RX ADMIN — SENNOSIDES, DOCUSATE SODIUM 2 TABLET: 50; 8.6 TABLET, FILM COATED ORAL at 06:39

## 2019-07-08 RX ADMIN — HEPARIN SODIUM 5000 UNITS: 5000 INJECTION, SOLUTION INTRAVENOUS; SUBCUTANEOUS at 21:28

## 2019-07-08 RX ADMIN — AMPICILLIN AND SULBACTAM 3 G: 1; 2 INJECTION, POWDER, FOR SOLUTION INTRAMUSCULAR; INTRAVENOUS at 06:23

## 2019-07-08 ASSESSMENT — ENCOUNTER SYMPTOMS
WHEEZING: 0
ABDOMINAL PAIN: 0
PALPITATIONS: 0
VOMITING: 0
FOCAL WEAKNESS: 0
WEAKNESS: 0
COUGH: 0
TINGLING: 0
SORE THROAT: 0
PND: 0
DIZZINESS: 0
CHILLS: 0
ORTHOPNEA: 0
SENSORY CHANGE: 0
NERVOUS/ANXIOUS: 0
FEVER: 0
DEPRESSION: 0
SHORTNESS OF BREATH: 0
CLAUDICATION: 0
CONSTIPATION: 0
SINUS PAIN: 0
HEADACHES: 0
INSOMNIA: 0
BRUISES/BLEEDS EASILY: 0
SPEECH CHANGE: 0
BLOOD IN STOOL: 0
DIARRHEA: 0
NAUSEA: 0

## 2019-07-08 ASSESSMENT — COGNITIVE AND FUNCTIONAL STATUS - GENERAL
TOILETING: A LITTLE
MOVING TO AND FROM BED TO CHAIR: A LITTLE
MOVING TO AND FROM BED TO CHAIR: A LOT
SUGGESTED CMS G CODE MODIFIER DAILY ACTIVITY: CK
MOBILITY SCORE: 16
DRESSING REGULAR UPPER BODY CLOTHING: A LITTLE
STANDING UP FROM CHAIR USING ARMS: A LITTLE
WALKING IN HOSPITAL ROOM: A LOT
DRESSING REGULAR LOWER BODY CLOTHING: A LOT
STANDING UP FROM CHAIR USING ARMS: A LOT
TURNING FROM BACK TO SIDE WHILE IN FLAT BAD: A LITTLE
HELP NEEDED FOR BATHING: A LOT
SUGGESTED CMS G CODE MODIFIER MOBILITY: CK
MOVING FROM LYING ON BACK TO SITTING ON SIDE OF FLAT BED: A LITTLE
TURNING FROM BACK TO SIDE WHILE IN FLAT BAD: A LITTLE
DRESSING REGULAR LOWER BODY CLOTHING: A LOT
MOBILITY SCORE: 12
CLIMB 3 TO 5 STEPS WITH RAILING: TOTAL
HELP NEEDED FOR BATHING: A LOT
CLIMB 3 TO 5 STEPS WITH RAILING: TOTAL
WALKING IN HOSPITAL ROOM: A LITTLE
SUGGESTED CMS G CODE MODIFIER DAILY ACTIVITY: CK
SUGGESTED CMS G CODE MODIFIER MOBILITY: CL
DAILY ACTIVITIY SCORE: 18
DRESSING REGULAR UPPER BODY CLOTHING: A LITTLE
MOVING FROM LYING ON BACK TO SITTING ON SIDE OF FLAT BED: A LOT
DAILY ACTIVITIY SCORE: 18
TOILETING: A LITTLE

## 2019-07-08 ASSESSMENT — GAIT ASSESSMENTS
GAIT LEVEL OF ASSIST: MINIMAL ASSIST
ASSISTIVE DEVICE: FRONT WHEEL WALKER
DEVIATION: ANTALGIC
DISTANCE (FEET): 5

## 2019-07-08 ASSESSMENT — ACTIVITIES OF DAILY LIVING (ADL): TOILETING: INDEPENDENT

## 2019-07-08 NOTE — DIETARY
Nutrition Services - Poor po and/or weight loss reported on admission. Malnutrition Screening Tool score <2. No other risk for malnutrition, or other nutrition concerns,  identified on screening. Nutrition assessment not indicated at this time.     Alert received for newly identified wound. Wound team consult pending, will await wound staging to make recommendations if appropriate.     RD will monitor per dept policy.

## 2019-07-08 NOTE — PROGRESS NOTES
"Pharmacy Kinetics 85 y.o. female on vancomycin day # 2  7/8/2019    Currently Dose: Vancomycin pulse dosing  7/7/19 1500 mg  7/8/19 VR 9.3 mcg/mL    Indication for Treatment: cellulitis  ID Service Following: No    Pertinent history per medical record: Admitted on 7/7/2019 for cellulitis/wound extremity of left lower extremity. Vascular consulted by admit provider due to concerns for necrotizing fasciiatis.     Other antibiotics: ampicillin/sulbactam 3 gm iv q12h    Allergies: Cephalexin [keflex]; Sulfa drugs; and Sulfamethoxazole w-trimethoprim     List concerns for accumulation of vancomycin: age 85, renal impairment, electrolyte derangement, BUN:SCr ~ 20:1    Pertinent cultures to date:   Results     Procedure Component Value Units Date/Time    BLOOD CULTURE [[id number]] Collected:  07/07/19 1325    Order Status:  Completed Specimen:  Blood from Peripheral Updated:  07/08/19 1005     Significant Indicator NEG     Source BLD     Site PERIPHERAL     Culture Result No Growth  Note: Blood cultures are incubated for 5 days and  are monitored continuously.Positive blood cultures  are called to the RN and reported as soon as  they are identified.      Narrative:       Per Hospital Policy: Only change Specimen Src: to \"Line\" if  specified by physician order.  Left Hand    BLOOD CULTURE [[id number]] Collected:  07/07/19 1325    Order Status:  Completed Specimen:  Blood from Peripheral Updated:  07/08/19 1005     Significant Indicator NEG     Source BLD     Site PERIPHERAL     Culture Result No Growth  Note: Blood cultures are incubated for 5 days and  are monitored continuously.Positive blood cultures  are called to the RN and reported as soon as  they are identified.      Narrative:       Per Hospital Policy: Only change Specimen Src: to \"Line\" if  specified by physician order.  Right AC    CULTURE WOUND W/ GRAM STAIN [[id number]]     Order Status:  No result Specimen:  Wound from Left Ankle     URINALYSIS [[id number]]  " "(Abnormal) Collected:  19    Order Status:  Completed Specimen:  Urine Updated:  19     Color Yellow     Character Clear     Specific Gravity 1.020     Ph 5.5     Glucose Negative mg/dL      Ketones Negative mg/dL      Protein 30 (A) mg/dL      Bilirubin Negative     Urobilinogen, Urine 0.2     Nitrite Negative     Leukocyte Esterase Trace (A)     Occult Blood Trace (A)     Micro Urine Req Microscopic    Narrative:       Indication for culture:->Emergency Room Patient  If not done within the last 24 hours    URINE CULTURE(NEW) [[id number]] Collected:  19    Order Status:  Completed Specimen:  Urine Updated:  19    Narrative:       Indication for culture:->Emergency Room Patient  If not done within the last 24 hours    Blood Culture [[id number]]     Order Status:  Sent Specimen:  Blood from Peripheral     Blood Culture [[id number]]     Order Status:  Sent Specimen:  Blood from Peripheral     Urinalysis [[id number]]     Order Status:  Canceled Specimen:  Urine         MRSA nares swab if pneumonia is a concern (ordered/positive/negative/n-a): n/a    Recent Labs      19   1255  19   0258   WBC  7.9  7.8   NEUTSPOLYS  80.20*   --      Recent Labs      19   1255  19   0258   BUN  28*  24*   CREATININE  1.13  0.91   ALBUMIN  3.1*   --       2019 12:11   Vancomycin Unknown Level 9.3     Intake/Output Summary (Last 24 hours) at 19 0809  Last data filed at 19 0630   Gross per 24 hour   Intake              220 ml   Output              400 ml   Net             -180 ml      /44   Pulse (!) 57   Temp 36.8 °C (98.2 °F) (Temporal)   Resp 18   Ht 1.575 m (5' 2\")   Wt 58.5 kg (129 lb)   SpO2 97%  Temp (24hrs), Av.9 °C (98.5 °F), Min:36.3 °C (97.3 °F), Max:38.4 °C (101.1 °F)    Estimated Creatinine Clearance: 35.7 mL/min (by C-G formula based on SCr of 0.91 mg/dL).    A/P   1. Vancomycin dose change: 900 mg iv once (~15 mg/kg/dose) "   2. Next vancomycin level: 7/9/19 @1200 (ordered)  3. Goal trough: 12-16 mcg/mL  4. Comments: VS stable. Febrile to Tmax 101.1 °F. WBC stable. CrCl ~36 mL/min (SCr improved). Microbiology pending. Concerns for accumulation of vancomycin noted. Most recent level nearly at goal after only loading dose. Will pulse dose with ~15 mg/kg once and place level ~22 hours post-dose. BMP with AM labs. Pharmacy will continue to follow.    Jt Draper, PharmD

## 2019-07-08 NOTE — CARE PLAN
Problem: Pain Management  Goal: Pain level will decrease to patient's comfort goal  Patient has had no complaints of pain.  Patient is resting comfortably.

## 2019-07-08 NOTE — ASSESSMENT & PLAN NOTE
Wound care ordered.  Mild to moderate arterial stenosis seen on arterial u/s  plavix  Heparin tid  Venous us negative.

## 2019-07-08 NOTE — CARE PLAN
Problem: Safety  Goal: Will remain free from falls  Patient's room is located near nurses station.  Bed alarm is in use.  Frequent rounding.

## 2019-07-08 NOTE — THERAPY
"Occupational Therapy Evaluation completed.   Functional Status:  Min A supine>sit EOB, max A LB dressing, min A sit>Stand and pivot to chair at bedside w/fww, c/o pain in LLE could not tolerate WB. Completed grooming and UB dressing seated in chair w/SBA. Alarm on and call light and tray table w/in reach RN aware   Plan of Care: Will benefit from Occupational Therapy 3 times per week  Discharge Recommendations:  Equipment: Will Continue to Assess for Equipment Needs. Post-acute therapy Recommend post-acute placement for additional occupational therapy services prior to discharge home. Patient can tolerate post-acute therapies at a 5x/week frequency.      See \"Rehab Therapy-Acute\" Patient Summary Report for complete documentation.    85 yr old female admitted for LLE edema, pain and general fatigue. PMHX, PAD, HTN and MRSA. This admission dx w/cellulitis of LLE, and chronic ulcer of bilateral ankles d/t PVD. Pt is demonstrating generalized weakness, pain and impaired balance impacting participation in ADLs. Pt will benefit from acute OT and currently recommend post acute placement prior to d/c home     "

## 2019-07-08 NOTE — PROGRESS NOTES
Hospital Medicine Daily Progress Note    Date of Service  7/8/2019    Chief Complaint  Increasing lower extremity erythema, swelling, pain  Fatigue    Hospital Course   Ms. Mata is an 85-year-old female [facility] [place] [facility] increasing left lower extremity erythema, edema, and pain, which was accompanied by increasing fatigue.  At the [facility] hospital she was diagnosed with cellulitis and lymphangitis, and given IV clindamycin and azithromycin prior to transfer here.  She does have a past medical history of moderate peripheral arterial disease and groin stents, and is usually followed by Dr. Walton of vascular surgery at a facility in [place].  She also has a chronic ulceration in that leg and has had previous MRSA infections.  Given the above findings there was concern for early necrotizing fasciitis so orthopedic surgery and vascular were both consulted.  Dr. Hollis evaluated the patient and felt there was no current indication for vascular intervention.  He recommended continuation of aggressive IV antibiotic therapy.  Consultation by orthopedic surgery is still pending.  Patient is currently receiving IV unasyn and vancomycin.  An x-ray of the ankle showed soft tissue swelling only.  Blood cultures are in process.      Interval Problem Update  Pain and tenderness still present in the left lower extremity, though her erythema seems to be improving already.  No drainage noted from her left ankle ulcer.  Has no other complaints and had no issues overnight.    No leukocytosis on this morning's lab work.  She does have microcytic anemia which we will work-up tomorrow morning.  BMP showed a minimally low potassium level of 3.5 in addition to a GFR of 59 (improved since admit).  Lactic acid levels remain normal.  Blood cultures in process.    T-max overnight 101.1 °F, HR 50s-80s, SBP 100s-130s, O2 saturations 96-99% on 1 L/min of oxygen via nasal  cannula.    7/7 BC NGTD.  Wound culture pending collection.     Consultants/Specialty  Orthopedic surgery  Vascular surgery    Code Status  Full code    Disposition  Clinical for now    Review of Systems  Review of Systems   Constitutional: Negative for chills, fever and malaise/fatigue.   HENT: Negative for congestion, sinus pain and sore throat.    Respiratory: Negative for cough, shortness of breath and wheezing.    Cardiovascular: Positive for leg swelling (Left lower extremity, right foot). Negative for chest pain, palpitations, orthopnea, claudication and PND.   Gastrointestinal: Negative for abdominal pain, blood in stool, constipation, diarrhea, nausea and vomiting.   Genitourinary: Negative for dysuria, frequency and urgency.   Musculoskeletal:        Left lower extremity pain and tenderness.   Neurological: Negative for dizziness, tingling, sensory change, speech change, focal weakness, weakness and headaches.   Endo/Heme/Allergies: Does not bruise/bleed easily.   Psychiatric/Behavioral: Negative for depression. The patient is not nervous/anxious and does not have insomnia.    All other systems reviewed and are negative.     Physical Exam  Temp:  [36.3 °C (97.3 °F)-38.4 °C (101.1 °F)] 36.7 °C (98.1 °F)  Pulse:  [57-84] 69  Resp:  [15-24] 16  BP: (109-152)/(44-62) 119/54  SpO2:  [88 %-100 %] 96 %    Physical Exam   Constitutional: She is oriented to person, place, and time. She appears well-developed and well-nourished. She is active and cooperative. She appears ill. No distress.   HENT:   Head: Normocephalic and atraumatic.   Eyes: Pupils are equal, round, and reactive to light. Conjunctivae are normal. Right eye exhibits no discharge. Left eye exhibits no discharge. No scleral icterus.   Neck: Normal range of motion. Neck supple. No JVD present.   Cardiovascular: Normal rate, regular rhythm, normal heart sounds and intact distal pulses.  Exam reveals no gallop and no friction rub.    No murmur  heard.  Pulses:       Dorsalis pedis pulses are 1+ on the right side, and 1+ on the left side.        Posterior tibial pulses are 1+ on the right side, and 1+ on the left side.   Pulmonary/Chest: Effort normal and breath sounds normal. No accessory muscle usage or stridor. No respiratory distress. She has no decreased breath sounds. She has no wheezes. She has no rhonchi. She has no rales.   Abdominal: Soft. She exhibits no distension. Bowel sounds are decreased. There is no tenderness. There is no rebound and no guarding.   Musculoskeletal: Normal range of motion. She exhibits no edema.   Neurological: She is alert and oriented to person, place, and time. No cranial nerve deficit or sensory deficit. GCS eye subscore is 4. GCS verbal subscore is 5. GCS motor subscore is 6.   Skin: Skin is warm and dry. No rash noted. She is not diaphoretic. There is erythema. No pallor.        Psychiatric: She has a normal mood and affect. Her speech is normal and behavior is normal. Judgment and thought content normal. Cognition and memory are normal.   Nursing note and vitals reviewed.    Fluids    Intake/Output Summary (Last 24 hours) at 07/08/19 1425  Last data filed at 07/08/19 0800   Gross per 24 hour   Intake              460 ml   Output              400 ml   Net               60 ml     Laboratory  Recent Labs      07/07/19   1255  07/08/19   0258   WBC  7.9  7.8   RBC  4.34  4.01*   HEMOGLOBIN  11.5*  10.4*   HEMATOCRIT  35.2*  32.5*   MCV  81.1*  81.0*   MCH  26.5*  25.9*   MCHC  32.7*  32.0*   RDW  43.1  43.2   PLATELETCT  177  178   MPV  10.2  10.7     Recent Labs      07/07/19   1255  07/08/19   0258   SODIUM  132*  136   POTASSIUM  3.4*  3.5*   CHLORIDE  101  105   CO2  23  22   GLUCOSE  89  125*   BUN  28*  24*   CREATININE  1.13  0.91   CALCIUM  9.1  8.6     Recent Labs      07/07/19   1255   APTT  36.6*   INR  1.00     Imaging  US-EXTREMITY ARTERY LOWER BILAT   Final Result      US-MARY SINGLE LEVEL BILAT   Final  Result      US-EXTREMITY ARTERY LOWER BILAT W/MARY (COMBO)   Final Result      DX-ANKLE 3+ VIEWS LEFT   Final Result      No definite acute osseous abnormality but the evaluation limited due to osseous demineralization.      Diffuse soft tissue swelling.      DX-CHEST-PORTABLE (1 VIEW)   Final Result         1. No acute cardiopulmonary abnormalities are identified.         Assessment/Plan  * Cellulitis of left lower extremity- (present on admission)   Assessment & Plan    No leukocytosis, lactic acid remains normal.  No current concern for necrotizing fasciitis.  X-ray of the left ankle showed soft tissue swelling only.  Pulses 1+ at best.  Continue IV antibiotics and pain control.  Blood cultures pending.  Orthopedic surgery has been consulted.     Chronic ulcer of bilateral ankles due to PVD ([facility])- (present on admission)   Assessment & Plan    Wound care ordered.  Attempt to obtain wound culture.     PAD (peripheral artery disease) ([facility])- (present on admission)   Assessment & Plan    Has moderate PAD.  Evaluated by Dr. Heaton of vascular surgery who was recommended against any type of surgical intervention.  Wound care consult for chronic ulcers.     Essential hypertension- (present on admission)   Assessment & Plan    Well-controlled on current regimen.  SBP 100s-130s.  Continue home amlodipine.        VTE prophylaxis: Subcutaneous heparin

## 2019-07-08 NOTE — WOUND TEAM
"[facility]  Inpatient Services  Initial Wound and Skin Care Evaluation    Admission Date: 7/7/2019     Consult Date: 07/08/2019  HPI, PMH, SH: Reviewed    Unit where seen by Wound Team: [id number]     WOUND CONSULT RELATED TO:  Arterial ulcer to right foot and left ankle     SUBJECTIVE:  \"I/ve been going to wound care in [place].\"      Self Report / Pain Level:  Shooting pain to LLE       OBJECTIVE:  Pleasant and engaging.    WOUND TYPE, LOCATION, CHARACTERISTICS (Pressure Injuries: location, stage, POA or date identified)     Wound 07/07/19 Arterial Ulcer Ankle left medial (Active)   Site Assessment Yellow;Drainage    Rula-wound Assessment Painful;Red;Edema;Hot    Margins Attached edges;Defined edges    Wound Length (cm) 2 cm    Wound Width (cm) 1.8 cm    Wound Depth (cm) 0.3 cm    Wound Surface Area (cm^2) 3.6 cm^2    Tunneling 0 cm    Undermining 0 cm    Closure None    Drainage Amount Small    Drainage Description Yellow    Non-staged Wound Description Full thickness    Treatments Cleansed;Site care    Cleansing Normal Saline Irrigation    Periwound Protectant Not Applicable    Dressing Options Honey Alginate;Adhesive Bandage    Dressing Cleansing/Solutions Not Applicable    Dressing Changed New    Dressing Status Clean;Dry;Intact    Dressing Change Frequency Every 48 hrs    NEXT Dressing Change  07/10/19    NEXT Weekly Photo (Inpatient Only) 07/10/19    WOUND NURSE ONLY - Odor None    WOUND NURSE ONLY - Exposed Structures None    WOUND NURSE ONLY - Tissue Type and Percentage 80% pink, 20% yellow          Wound 07/07/19 Arterial Ulcer Foot right, proximal, lateral (Active)   Site Assessment Red    Rula-wound Assessment Warm;Scar tissue;Pink    Margins Attached edges    Wound Length (cm) 2 cm    Wound Width (cm) 1.5 cm    Wound Depth (cm) 0.1 cm    Wound Surface Area (cm^2) 3 cm^2    Tunneling 0 cm    Undermining 0 cm    Closure None    Drainage Amount Scant    Drainage Description Serosanguineous  "   Non-staged Wound Description Partial thickness    Treatments Cleansed;Site care    Cleansing Normal Saline Irrigation    Periwound Protectant Not Applicable    Dressing Options Honey Alginate;Adhesive Foam    Dressing Cleansing/Solutions Not Applicable    Dressing Changed New    Dressing Status Clean;Dry;Intact    Dressing Change Frequency Every 48 hrs    NEXT Dressing Change  07/10/19    NEXT Weekly Photo (Inpatient Only) 07/10/19    WOUND NURSE ONLY - Odor None    WOUND NURSE ONLY - Exposed Structures None    WOUND NURSE ONLY - Tissue Type and Percentage 100% red      Vascular:    Dorsal Pedal pulses:    Posterior tib pulses:       MARY:      07/07/2019                  RIGHT     Waveform            Systolic BPs (mmHg)                              130           Brachial   Triphasic                                Common Femoral   Not attained                             Posterior Tibial   Bi, non-                   118           Dorsalis Pedis   reversed                                            Peroneal                              0.86          MARY                                            TBI                          LEFT   Waveform        Systolic BPs (mmHg)                              137           Brachial   Bi, non-                                 Common Femoral   reversed   Absent                                   Posterior Tibial   Monophasic                 74            Dorsalis Pedis                                            Peroneal                              0.54          MARY                                            TBI       Findings   Right.    Doppler waveform of the common femoral artery is of high amplitude and    triphasic.    Doppler waveform of the dorsalis pedis artery is slightly dampened and    biphasic without reversal of flow.   Doppler waveform of the posterior tibial artery could not be obtained    secondary to ankle bandaging.   Ankle-brachial index is mildly reduced.       Left.    Doppler waveform of the common femoral artery is of high amplitude and    biphasic without reversal of flow.   Doppler waveform of the posterior tibial artery is absent.   Doppler waveform of the dorsalis pedis artery is monophasic with moderate    amplitude.   Ankle-brachial index is moderately reduced.        Arterial duplex scan was performed in accordance with lower extremity    arterial evaluation protocol - see separate report.*    Lab Values:    WBC:       WBC   Date/Time Value Ref Range Status   07/08/2019 02:58 AM 7.8 4.8 - 10.8 K/uL Final     A1C:    No results found for: HBA1C        Culture:   Obtained no, Results NA    INTERVENTIONS BY WOUND TEAM:  Left medial ankle wound open to air, wet. Right medial foot with clear petrolatum gauze and gauze. Removed that dressing. Both wounds cleaned with saline moistened gauze. Able to remove loose yellow exudate on left with gauze. Dressed both wounds. Placed dressing care order     Dressing selection:  Honey alginate, adhesive silicone foams         Interdisciplinary consultation: Patient, Bedside RN     EVALUATION: Honey alginate provides topical antimicrobial treatment and will encourage autolytic debridement of exudate/crust.    Factors affecting wound healing: arterial disease, age.  Goals: Steady decrease in wound area and depth weekly.    NURSING PLAN OF CARE ORDERS (X):    Dressing changes: See Dressing Care orders: X  Skin care: See Skin Care orders:   Rectal tube care: See Rectal Tube Care orders:   Other orders:    RSKIN: CURRENT (X) ORDERED (O):   Q shift Marlon:  X  Q shift pressure point assessments:  X  Pressure redistribution mattress  X          RONNIE          Bariatric RONNIE         Bariatric foam           Heel float boots          Float Heels off Bed with Pillows               Barrier wipes         Barrier Cream         Barrier paste          Sacral silicone dressing         Silicone O2 tubing         Anchorfast         Cannula  fixation Device (Tender )          Gray Foam Ear protectors           Trach with Optifoam split foam                 Waffle cushion        Waffle Overlay         Rectal tube or BMS         Antifungal tx      Interdry          Reposition q 2 hours    X    Up to chair        Ambulate      PT/OT        Dietician        Diabetes Education      PO X    TF     TPN     NPO   # days   Other        WOUND TEAM PLAN OF CARE (X):   NPWT change 3 x week:        Dressing changes by wound team:       Follow up as needed:   X    Other (explain):     Anticipated discharge plans (X):   SNF:           Home Care:           Outpatient Wound Center:            Self Care:            Other:   To be determined.

## 2019-07-08 NOTE — PROGRESS NOTES
Called Dr Castillo to inform him of bladder scan of 470.  Patient has not urinated since earlier in shift.  Dr ordered a one time straight cath.

## 2019-07-08 NOTE — THERAPY
"Physical Therapy Evaluation completed.   Bed Mobility:  Supine to Sit:  (up chair)  Transfers: Sit to Stand: Minimal Assist  Gait: Level Of Assist: Minimal Assist with Front-Wheel Walker       Plan of Care: Will benefit from Physical Therapy 3 times per week  Discharge Recommendations: Equipment: Will Continue to Assess for Equipment Needs. Post-acute therapy Discharge to a transitional care facility for continued skilled therapy services.(depending on progress)  The patient is a 84 yo female who presents with increased pain and erythema left leg.  Reports a chronic ulceration on the medial malleolus of her left ankle. Today, pt was able to stand-pivot with min A from bs chair to commode and take a few small steps to bed with min A. Pain is limiting ambulation distance. Pt will need inpt PT to address problems and assist with d/c plan. Pt may need a transitional care stay before home depending on progress.     See \"Rehab Therapy-Acute\" Patient Summary Report for complete documentation.     "

## 2019-07-08 NOTE — PROGRESS NOTES
Vascular Surgery     Awake alert  Some interval improvement left leg cellulitis     Continue IV Abx     Follow     Rde Heaton MD

## 2019-07-09 PROBLEM — D64.9 ANEMIA: Status: ACTIVE | Noted: 2019-07-09

## 2019-07-09 LAB
ANION GAP SERPL CALC-SCNC: 7 MMOL/L (ref 0–11.9)
BACTERIA UR CULT: NORMAL
BASOPHILS # BLD AUTO: 0.3 % (ref 0–1.8)
BASOPHILS # BLD: 0.02 K/UL (ref 0–0.12)
BUN SERPL-MCNC: 20 MG/DL (ref 8–22)
CALCIUM SERPL-MCNC: 8.9 MG/DL (ref 8.5–10.5)
CHLORIDE SERPL-SCNC: 107 MMOL/L (ref 96–112)
CO2 SERPL-SCNC: 22 MMOL/L (ref 20–33)
CREAT SERPL-MCNC: 0.86 MG/DL (ref 0.5–1.4)
EOSINOPHIL # BLD AUTO: 0.12 K/UL (ref 0–0.51)
EOSINOPHIL NFR BLD: 2 % (ref 0–6.9)
ERYTHROCYTE [DISTWIDTH] IN BLOOD BY AUTOMATED COUNT: 42.4 FL (ref 35.9–50)
GLUCOSE SERPL-MCNC: 111 MG/DL (ref 65–99)
HCT VFR BLD AUTO: 29.9 % (ref 37–47)
HGB BLD-MCNC: 9.8 G/DL (ref 12–16)
IMM GRANULOCYTES # BLD AUTO: 0.07 K/UL (ref 0–0.11)
IMM GRANULOCYTES NFR BLD AUTO: 1.2 % (ref 0–0.9)
LYMPHOCYTES # BLD AUTO: 1.12 K/UL (ref 1–4.8)
LYMPHOCYTES NFR BLD: 18.7 % (ref 22–41)
MCH RBC QN AUTO: 26.1 PG (ref 27–33)
MCHC RBC AUTO-ENTMCNC: 32.8 G/DL (ref 33.6–35)
MCV RBC AUTO: 79.7 FL (ref 81.4–97.8)
MONOCYTES # BLD AUTO: 0.46 K/UL (ref 0–0.85)
MONOCYTES NFR BLD AUTO: 7.7 % (ref 0–13.4)
NEUTROPHILS # BLD AUTO: 4.2 K/UL (ref 2–7.15)
NEUTROPHILS NFR BLD: 70.1 % (ref 44–72)
NRBC # BLD AUTO: 0 K/UL
NRBC BLD-RTO: 0 /100 WBC
PLATELET # BLD AUTO: 195 K/UL (ref 164–446)
PMV BLD AUTO: 10.6 FL (ref 9–12.9)
POTASSIUM SERPL-SCNC: 3.7 MMOL/L (ref 3.6–5.5)
RBC # BLD AUTO: 3.75 M/UL (ref 4.2–5.4)
SIGNIFICANT IND 70042: NORMAL
SITE SITE: NORMAL
SODIUM SERPL-SCNC: 136 MMOL/L (ref 135–145)
SOURCE SOURCE: NORMAL
VANCOMYCIN SERPL-MCNC: 9.1 UG/ML
WBC # BLD AUTO: 6 K/UL (ref 4.8–10.8)

## 2019-07-09 PROCEDURE — 700105 HCHG RX REV CODE 258: Performed by: HOSPITALIST

## 2019-07-09 PROCEDURE — 87070 CULTURE OTHR SPECIMN AEROBIC: CPT

## 2019-07-09 PROCEDURE — 700111 HCHG RX REV CODE 636 W/ 250 OVERRIDE (IP): Performed by: HOSPITALIST

## 2019-07-09 PROCEDURE — A9270 NON-COVERED ITEM OR SERVICE: HCPCS | Performed by: INTERNAL MEDICINE

## 2019-07-09 PROCEDURE — 85025 COMPLETE CBC W/AUTO DIFF WBC: CPT

## 2019-07-09 PROCEDURE — 770021 HCHG ROOM/CARE - ISO PRIVATE

## 2019-07-09 PROCEDURE — 36415 COLL VENOUS BLD VENIPUNCTURE: CPT

## 2019-07-09 PROCEDURE — 700102 HCHG RX REV CODE 250 W/ 637 OVERRIDE(OP): Performed by: HOSPITALIST

## 2019-07-09 PROCEDURE — 700111 HCHG RX REV CODE 636 W/ 250 OVERRIDE (IP): Performed by: INTERNAL MEDICINE

## 2019-07-09 PROCEDURE — A9270 NON-COVERED ITEM OR SERVICE: HCPCS | Performed by: HOSPITALIST

## 2019-07-09 PROCEDURE — 80048 BASIC METABOLIC PNL TOTAL CA: CPT

## 2019-07-09 PROCEDURE — 99233 SBSQ HOSP IP/OBS HIGH 50: CPT | Performed by: HOSPITALIST

## 2019-07-09 PROCEDURE — 700105 HCHG RX REV CODE 258: Performed by: INTERNAL MEDICINE

## 2019-07-09 PROCEDURE — 700102 HCHG RX REV CODE 250 W/ 637 OVERRIDE(OP): Performed by: INTERNAL MEDICINE

## 2019-07-09 PROCEDURE — 80202 ASSAY OF VANCOMYCIN: CPT

## 2019-07-09 PROCEDURE — 87205 SMEAR GRAM STAIN: CPT

## 2019-07-09 RX ORDER — CYCLOBENZAPRINE HCL 10 MG
10 TABLET ORAL 3 TIMES DAILY PRN
Status: DISCONTINUED | OUTPATIENT
Start: 2019-07-09 | End: 2019-07-16 | Stop reason: HOSPADM

## 2019-07-09 RX ADMIN — AMPICILLIN AND SULBACTAM 3 G: 1; 2 INJECTION, POWDER, FOR SOLUTION INTRAMUSCULAR; INTRAVENOUS at 22:51

## 2019-07-09 RX ADMIN — ACETAMINOPHEN 650 MG: 325 TABLET, FILM COATED ORAL at 22:46

## 2019-07-09 RX ADMIN — HEPARIN SODIUM 5000 UNITS: 5000 INJECTION, SOLUTION INTRAVENOUS; SUBCUTANEOUS at 22:32

## 2019-07-09 RX ADMIN — HEPARIN SODIUM 5000 UNITS: 5000 INJECTION, SOLUTION INTRAVENOUS; SUBCUTANEOUS at 05:57

## 2019-07-09 RX ADMIN — SODIUM CHLORIDE: 9 INJECTION, SOLUTION INTRAVENOUS at 16:25

## 2019-07-09 RX ADMIN — CYCLOBENZAPRINE HYDROCHLORIDE 10 MG: 10 TABLET, FILM COATED ORAL at 18:51

## 2019-07-09 RX ADMIN — AMPICILLIN AND SULBACTAM 3 G: 1; 2 INJECTION, POWDER, FOR SOLUTION INTRAMUSCULAR; INTRAVENOUS at 05:57

## 2019-07-09 RX ADMIN — ACETAMINOPHEN 650 MG: 325 TABLET, FILM COATED ORAL at 05:57

## 2019-07-09 RX ADMIN — CLOPIDOGREL BISULFATE 75 MG: 75 TABLET ORAL at 05:56

## 2019-07-09 RX ADMIN — HEPARIN SODIUM 5000 UNITS: 5000 INJECTION, SOLUTION INTRAVENOUS; SUBCUTANEOUS at 12:39

## 2019-07-09 RX ADMIN — Medication 1 CAPSULE: at 09:16

## 2019-07-09 RX ADMIN — SENNOSIDES, DOCUSATE SODIUM 2 TABLET: 50; 8.6 TABLET, FILM COATED ORAL at 05:56

## 2019-07-09 RX ADMIN — VANCOMYCIN HYDROCHLORIDE 900 MG: 500 INJECTION, POWDER, LYOPHILIZED, FOR SOLUTION INTRAVENOUS at 18:48

## 2019-07-09 RX ADMIN — AMPICILLIN AND SULBACTAM 3 G: 1; 2 INJECTION, POWDER, FOR SOLUTION INTRAMUSCULAR; INTRAVENOUS at 12:38

## 2019-07-09 RX ADMIN — AMPICILLIN AND SULBACTAM 3 G: 1; 2 INJECTION, POWDER, FOR SOLUTION INTRAMUSCULAR; INTRAVENOUS at 18:00

## 2019-07-09 RX ADMIN — AMLODIPINE BESYLATE 10 MG: 10 TABLET ORAL at 05:57

## 2019-07-09 ASSESSMENT — ENCOUNTER SYMPTOMS
INSOMNIA: 0
ABDOMINAL PAIN: 0
TINGLING: 0
SHORTNESS OF BREATH: 0
COUGH: 0
DIZZINESS: 0
DIARRHEA: 0
ORTHOPNEA: 0
FEVER: 0
CLAUDICATION: 0
PALPITATIONS: 0
SINUS PAIN: 0
SORE THROAT: 0
PND: 0
NAUSEA: 0
FOCAL WEAKNESS: 0
SPEECH CHANGE: 0
BLOOD IN STOOL: 0
CONSTIPATION: 0
VOMITING: 0
CHILLS: 0
DEPRESSION: 0
HEADACHES: 0
BRUISES/BLEEDS EASILY: 0
WHEEZING: 0
NERVOUS/ANXIOUS: 0
WEAKNESS: 0
SENSORY CHANGE: 0

## 2019-07-09 NOTE — PROGRESS NOTES
Vascular Surgery     Awake alert  Reports some interval improvement in pain   Erythema slightly improved     Continue IV Abx     Nothing to add    Red Heaton MD

## 2019-07-09 NOTE — DISCHARGE PLANNING
Care Transition Team Assessment    Information Source  Orientation : Oriented x 4  Information Given By: Patient  Who is responsible for making decisions for patient? : Patient    Readmission Evaluation  Is this a readmission?: No    Elopement Risk  Legal Hold: No  Ambulatory or Self Mobile in Wheelchair: No-Not an Elopement Risk  Elopement Risk: Not at Risk for Elopement    Interdisciplinary Discharge Planning  Lives with - Patient's Self Care Capacity: Alone and Able to Care For Self  Patient or legal guardian wants to designate a caregiver (see row info): No  Housing / Facility: [address]  Prior Services: None    Discharge Preparedness  What is your plan after discharge?: Uncertain - pending medical team collaboration  What are your discharge supports?: Child  Prior Functional Level: Uses Walker, Independent with Activities of Daily Living, Independent with Medication Management, Drives Self  Difficulity with ADLs: None  Difficulity with IADLs: None    Functional Assesment  Prior Functional Level: Uses Walker, Independent with Activities of Daily Living, Independent with Medication Management, Drives Self    Finances  Prescription Coverage: Yes    Vision / Hearing Impairment  Vision Impairment : No  Right Eye Vision: Impaired, Other (Comments) (readers)  Left Eye Vision: Impaired, Other (Comments) (readers)  Hearing Impairment : No  Hearing Impairment: Both Ears              Domestic Abuse  Have you ever been the victim of abuse or violence?: No  Physical Abuse or Sexual Abuse: No  Verbal Abuse or Emotional Abuse: No  Possible Abuse Reported to:: Not Applicable    Psychological Assessment  History of Substance Abuse: None  History of Psychiatric Problems: No  Non-compliant with Treatment: No  Newly Diagnosed Illness: No         Anticipated Discharge Information  Anticipated discharge disposition: Discharge needs currently unknown

## 2019-07-09 NOTE — PROGRESS NOTES
"Pharmacy Kinetics 85 y.o. female on vancomycin day # 3  7/9/2019    Currently Dose: Vancomycin pulse dosing  7/7/19 1500 mg  7/8/19 VR 9.3 mcg/ml  7/9//19 VR 9.1 mcg/ml     Indication for Treatment: cellulitis  ID Service Following: No     Pertinent history per medical record: Admitted on 7/7/2019 for cellulitis/wound extremity of left lower extremity. Vascular consulted by admit provider due to concerns for necrotizing fasciiatis.      Other antibiotics: ampicillin/sulbactam 3 gm iv q12h     Allergies: Cephalexin [keflex]; Sulfa drugs; and Sulfamethoxazole w-trimethoprim      List concerns for accumulation of vancomycin: age 85, renal impairment, electrolyte derangement, BUN:SCr ~ 20:1     Pertinent cultures to date:   Results     Procedure Component Value Units Date/Time    URINE CULTURE(NEW) [[id number]] Collected:  07/07/19 1650    Order Status:  Completed Specimen:  Urine Updated:  07/09/19 0706     Significant Indicator NEG     Source UR     Site -     Culture Result No growth at 48 hours.    Narrative:       Indication for culture:->Emergency Room Patient  If not done within the last 24 hours  Indication for culture:->Emergency Room Patient    BLOOD CULTURE [[id number]] Collected:  07/07/19 1325    Order Status:  Completed Specimen:  Blood from Peripheral Updated:  07/08/19 1005     Significant Indicator NEG     Source BLD     Site PERIPHERAL     Culture Result No Growth  Note: Blood cultures are incubated for 5 days and  are monitored continuously.Positive blood cultures  are called to the RN and reported as soon as  they are identified.      Narrative:       Per Hospital Policy: Only change Specimen Src: to \"Line\" if  specified by physician order.  Left Hand    BLOOD CULTURE [[id number]] Collected:  07/07/19 1325    Order Status:  Completed Specimen:  Blood from Peripheral Updated:  07/08/19 1005     Significant Indicator NEG     Source BLD     Site PERIPHERAL     Culture Result No Growth  Note: Blood cultures " "are incubated for 5 days and  are monitored continuously.Positive blood cultures  are called to the RN and reported as soon as  they are identified.      Narrative:       Per Hospital Policy: Only change Specimen Src: to \"Line\" if  specified by physician order.  Right AC    CULTURE WOUND W/ GRAM STAIN [[id number]]     Order Status:  No result Specimen:  Wound from Left Ankle     URINALYSIS [[id number]]  (Abnormal) Collected:  07/07/19 1650    Order Status:  Completed Specimen:  Urine Updated:  07/07/19 1723     Color Yellow     Character Clear     Specific Gravity 1.020     Ph 5.5     Glucose Negative mg/dL      Ketones Negative mg/dL      Protein 30 (A) mg/dL      Bilirubin Negative     Urobilinogen, Urine 0.2     Nitrite Negative     Leukocyte Esterase Trace (A)     Occult Blood Trace (A)     Micro Urine Req Microscopic    Narrative:       Indication for culture:->Emergency Room Patient  If not done within the last 24 hours    Blood Culture [[id number]]     Order Status:  Sent Specimen:  Blood from Peripheral     Blood Culture [[id number]]     Order Status:  Sent Specimen:  Blood from Peripheral     Urinalysis [[id number]]     Order Status:  Canceled Specimen:  Urine         MRSA nares swab if pneumonia is a concern (ordered/positive/negative/n-a): n/a    Recent Labs      07/07/19   1255  07/08/19   0258  07/09/19   0400   WBC  7.9  7.8  6.0   NEUTSPOLYS  80.20*   --   70.10     Recent Labs      07/07/19   1255  07/08/19   0258  07/09/19   0400   BUN  28*  24*  20   CREATININE  1.13  0.91  0.86   ALBUMIN  3.1*   --    --      Recent Labs      07/08/19   1211   VANCORANDOM  9.3     Intake/Output Summary (Last 24 hours) at 07/09/19 0738  Last data filed at 07/08/19 1200   Gross per 24 hour   Intake              480 ml   Output                0 ml   Net              480 ml      /60   Pulse 67   Temp 37.9 °C (100.2 °F) (Temporal)   Resp 18   Ht 1.575 m (5' 2\")   Wt 58.5 kg (129 lb)   SpO2 92%  Temp (24hrs), " Av.3 °C (99.2 °F), Min:36.6 °C (97.9 °F), Max:38 °C (100.4 °F)    Estimated Creatinine Clearance: 37.8 mL/min (by C-G formula based on SCr of 0.86 mg/dL).    A/P   1. Vancomycin dose change: 900 mg iv q24h (~15 mg/kg/dose)   2. Next vancomycin level: 19 @1430 (ordered)  3. Goal trough: 12-16 mcg/mL  4. Comments: VS stable. Afebrile. Febrile to Tmax 100.4 °F. WBC stable. CrCl ~38 mL/min. Microbiology pending. Concerns for accumulation of vancomycin noted. Most recent vancomycin level noted and patient appears likely to tolerate conservative Q24H dosing. Repeat vancomycin level in place prior to PM dose 19. BMP with AM labs. Pharmacy will continue to follow.    Jt Draper, PharmD

## 2019-07-10 ENCOUNTER — APPOINTMENT (OUTPATIENT)
Dept: RADIOLOGY | Facility: MEDICAL CENTER | Age: 84
DRG: 602 | End: 2019-07-10
Attending: HOSPITALIST
Payer: MEDICARE

## 2019-07-10 PROBLEM — D50.9 IRON DEFICIENCY ANEMIA: Status: ACTIVE | Noted: 2019-07-09

## 2019-07-10 LAB
ANION GAP SERPL CALC-SCNC: 7 MMOL/L (ref 0–11.9)
BASOPHILS # BLD AUTO: 0.5 % (ref 0–1.8)
BASOPHILS # BLD: 0.03 K/UL (ref 0–0.12)
BUN SERPL-MCNC: 17 MG/DL (ref 8–22)
CALCIUM SERPL-MCNC: 8.7 MG/DL (ref 8.5–10.5)
CHLORIDE SERPL-SCNC: 107 MMOL/L (ref 96–112)
CO2 SERPL-SCNC: 21 MMOL/L (ref 20–33)
CREAT SERPL-MCNC: 0.87 MG/DL (ref 0.5–1.4)
EOSINOPHIL # BLD AUTO: 0.12 K/UL (ref 0–0.51)
EOSINOPHIL NFR BLD: 2.2 % (ref 0–6.9)
ERYTHROCYTE [DISTWIDTH] IN BLOOD BY AUTOMATED COUNT: 45.3 FL (ref 35.9–50)
FERRITIN SERPL-MCNC: 168.4 NG/ML (ref 10–291)
GLUCOSE SERPL-MCNC: 98 MG/DL (ref 65–99)
GRAM STN SPEC: NORMAL
HCT VFR BLD AUTO: 30.1 % (ref 37–47)
HGB BLD-MCNC: 9.2 G/DL (ref 12–16)
HGB RETIC QN AUTO: 24.8 PG/CELL (ref 29–35)
IMM GRANULOCYTES # BLD AUTO: 0.08 K/UL (ref 0–0.11)
IMM GRANULOCYTES NFR BLD AUTO: 1.5 % (ref 0–0.9)
IMM RETICS NFR: 9.3 % (ref 9.3–17.4)
IRON SATN MFR SERPL: ABNORMAL % (ref 15–55)
IRON SERPL-MCNC: <10 UG/DL (ref 40–170)
LYMPHOCYTES # BLD AUTO: 1.34 K/UL (ref 1–4.8)
LYMPHOCYTES NFR BLD: 24.5 % (ref 22–41)
MCH RBC QN AUTO: 25.3 PG (ref 27–33)
MCHC RBC AUTO-ENTMCNC: 30.6 G/DL (ref 33.6–35)
MCV RBC AUTO: 82.7 FL (ref 81.4–97.8)
MONOCYTES # BLD AUTO: 0.47 K/UL (ref 0–0.85)
MONOCYTES NFR BLD AUTO: 8.6 % (ref 0–13.4)
NEUTROPHILS # BLD AUTO: 3.44 K/UL (ref 2–7.15)
NEUTROPHILS NFR BLD: 62.7 % (ref 44–72)
NRBC # BLD AUTO: 0 K/UL
NRBC BLD-RTO: 0 /100 WBC
PLATELET # BLD AUTO: 216 K/UL (ref 164–446)
PMV BLD AUTO: 10.7 FL (ref 9–12.9)
POTASSIUM SERPL-SCNC: 3.8 MMOL/L (ref 3.6–5.5)
RBC # BLD AUTO: 3.64 M/UL (ref 4.2–5.4)
RETICS # AUTO: 0.02 M/UL (ref 0.04–0.06)
RETICS/RBC NFR: 0.7 % (ref 0.8–2.1)
SIGNIFICANT IND 70042: NORMAL
SITE SITE: NORMAL
SODIUM SERPL-SCNC: 135 MMOL/L (ref 135–145)
SOURCE SOURCE: NORMAL
TIBC SERPL-MCNC: 218 UG/DL (ref 250–450)
VIT B12 SERPL-MCNC: 799 PG/ML (ref 211–911)
WBC # BLD AUTO: 5.5 K/UL (ref 4.8–10.8)

## 2019-07-10 PROCEDURE — A9270 NON-COVERED ITEM OR SERVICE: HCPCS | Performed by: INTERNAL MEDICINE

## 2019-07-10 PROCEDURE — 99233 SBSQ HOSP IP/OBS HIGH 50: CPT | Performed by: HOSPITALIST

## 2019-07-10 PROCEDURE — 80048 BASIC METABOLIC PNL TOTAL CA: CPT

## 2019-07-10 PROCEDURE — 85025 COMPLETE CBC W/AUTO DIFF WBC: CPT

## 2019-07-10 PROCEDURE — 36415 COLL VENOUS BLD VENIPUNCTURE: CPT

## 2019-07-10 PROCEDURE — A9270 NON-COVERED ITEM OR SERVICE: HCPCS | Performed by: HOSPITALIST

## 2019-07-10 PROCEDURE — 82728 ASSAY OF FERRITIN: CPT

## 2019-07-10 PROCEDURE — 83550 IRON BINDING TEST: CPT

## 2019-07-10 PROCEDURE — 97110 THERAPEUTIC EXERCISES: CPT

## 2019-07-10 PROCEDURE — 700111 HCHG RX REV CODE 636 W/ 250 OVERRIDE (IP): Performed by: INTERNAL MEDICINE

## 2019-07-10 PROCEDURE — 700111 HCHG RX REV CODE 636 W/ 250 OVERRIDE (IP): Mod: JG | Performed by: HOSPITALIST

## 2019-07-10 PROCEDURE — 82607 VITAMIN B-12: CPT

## 2019-07-10 PROCEDURE — 700105 HCHG RX REV CODE 258: Performed by: HOSPITALIST

## 2019-07-10 PROCEDURE — 770021 HCHG ROOM/CARE - ISO PRIVATE

## 2019-07-10 PROCEDURE — 83540 ASSAY OF IRON: CPT

## 2019-07-10 PROCEDURE — 85046 RETICYTE/HGB CONCENTRATE: CPT

## 2019-07-10 PROCEDURE — 700102 HCHG RX REV CODE 250 W/ 637 OVERRIDE(OP): Performed by: HOSPITALIST

## 2019-07-10 PROCEDURE — 97535 SELF CARE MNGMENT TRAINING: CPT

## 2019-07-10 PROCEDURE — 97530 THERAPEUTIC ACTIVITIES: CPT

## 2019-07-10 PROCEDURE — 700102 HCHG RX REV CODE 250 W/ 637 OVERRIDE(OP): Performed by: INTERNAL MEDICINE

## 2019-07-10 PROCEDURE — 93970 EXTREMITY STUDY: CPT

## 2019-07-10 RX ORDER — DIPHENHYDRAMINE HYDROCHLORIDE 50 MG/ML
25 INJECTION INTRAMUSCULAR; INTRAVENOUS ONCE
Status: COMPLETED | OUTPATIENT
Start: 2019-07-10 | End: 2019-07-10

## 2019-07-10 RX ORDER — HYDROCODONE BITARTRATE AND ACETAMINOPHEN 5; 325 MG/1; MG/1
1-2 TABLET ORAL EVERY 6 HOURS PRN
Status: DISCONTINUED | OUTPATIENT
Start: 2019-07-10 | End: 2019-07-15

## 2019-07-10 RX ORDER — ACETAMINOPHEN 325 MG/1
650 TABLET ORAL ONCE
Status: COMPLETED | OUTPATIENT
Start: 2019-07-10 | End: 2019-07-10

## 2019-07-10 RX ORDER — DIPHENHYDRAMINE HCL 25 MG
25 TABLET ORAL ONCE
Status: COMPLETED | OUTPATIENT
Start: 2019-07-10 | End: 2019-07-10

## 2019-07-10 RX ADMIN — SODIUM CHLORIDE 25 MG: 9 INJECTION, SOLUTION INTRAVENOUS at 17:17

## 2019-07-10 RX ADMIN — VANCOMYCIN HYDROCHLORIDE 900 MG: 500 INJECTION, POWDER, LYOPHILIZED, FOR SOLUTION INTRAVENOUS at 14:50

## 2019-07-10 RX ADMIN — HEPARIN SODIUM 5000 UNITS: 5000 INJECTION, SOLUTION INTRAVENOUS; SUBCUTANEOUS at 13:24

## 2019-07-10 RX ADMIN — ACETAMINOPHEN 650 MG: 325 TABLET, FILM COATED ORAL at 17:00

## 2019-07-10 RX ADMIN — SENNOSIDES, DOCUSATE SODIUM 2 TABLET: 50; 8.6 TABLET, FILM COATED ORAL at 06:02

## 2019-07-10 RX ADMIN — SODIUM CHLORIDE 1275 MG: 9 INJECTION, SOLUTION INTRAVENOUS at 22:04

## 2019-07-10 RX ADMIN — Medication 1 CAPSULE: at 08:44

## 2019-07-10 RX ADMIN — ENOXAPARIN SODIUM 40 MG: 100 INJECTION SUBCUTANEOUS at 22:01

## 2019-07-10 RX ADMIN — HEPARIN SODIUM 5000 UNITS: 5000 INJECTION, SOLUTION INTRAVENOUS; SUBCUTANEOUS at 06:03

## 2019-07-10 RX ADMIN — DIPHENHYDRAMINE HCL 25 MG: 25 TABLET ORAL at 17:00

## 2019-07-10 RX ADMIN — AMPICILLIN AND SULBACTAM 3 G: 1; 2 INJECTION, POWDER, FOR SOLUTION INTRAMUSCULAR; INTRAVENOUS at 06:02

## 2019-07-10 RX ADMIN — AMLODIPINE BESYLATE 10 MG: 10 TABLET ORAL at 06:02

## 2019-07-10 RX ADMIN — AMPICILLIN AND SULBACTAM 3 G: 1; 2 INJECTION, POWDER, FOR SOLUTION INTRAMUSCULAR; INTRAVENOUS at 17:56

## 2019-07-10 RX ADMIN — CLOPIDOGREL BISULFATE 75 MG: 75 TABLET ORAL at 06:02

## 2019-07-10 RX ADMIN — AMPICILLIN AND SULBACTAM 3 G: 1; 2 INJECTION, POWDER, FOR SOLUTION INTRAMUSCULAR; INTRAVENOUS at 13:24

## 2019-07-10 ASSESSMENT — ENCOUNTER SYMPTOMS
ABDOMINAL PAIN: 0
VOMITING: 0
SORE THROAT: 0
FOCAL WEAKNESS: 0
COUGH: 0
CHILLS: 0
WHEEZING: 0
DIARRHEA: 0
WEAKNESS: 0
FEVER: 0
DEPRESSION: 0
PND: 0
CONSTIPATION: 0
CLAUDICATION: 0
TINGLING: 0
NERVOUS/ANXIOUS: 0
ORTHOPNEA: 0
HEADACHES: 0
BRUISES/BLEEDS EASILY: 0
BLOOD IN STOOL: 0
SENSORY CHANGE: 0
SINUS PAIN: 0
PALPITATIONS: 0
NAUSEA: 0
SHORTNESS OF BREATH: 0
SPEECH CHANGE: 0
INSOMNIA: 0
DIZZINESS: 0

## 2019-07-10 ASSESSMENT — COGNITIVE AND FUNCTIONAL STATUS - GENERAL
MOBILITY SCORE: 16
TURNING FROM BACK TO SIDE WHILE IN FLAT BAD: A LITTLE
EATING MEALS: A LITTLE
HELP NEEDED FOR BATHING: A LOT
MOVING FROM LYING ON BACK TO SITTING ON SIDE OF FLAT BED: A LITTLE
TOILETING: A LITTLE
SUGGESTED CMS G CODE MODIFIER DAILY ACTIVITY: CK
CLIMB 3 TO 5 STEPS WITH RAILING: TOTAL
WALKING IN HOSPITAL ROOM: A LITTLE
DRESSING REGULAR UPPER BODY CLOTHING: A LITTLE
DAILY ACTIVITIY SCORE: 16
PERSONAL GROOMING: A LITTLE
SUGGESTED CMS G CODE MODIFIER MOBILITY: CK
DRESSING REGULAR LOWER BODY CLOTHING: A LOT
STANDING UP FROM CHAIR USING ARMS: A LITTLE
MOVING TO AND FROM BED TO CHAIR: A LITTLE

## 2019-07-10 ASSESSMENT — GAIT ASSESSMENTS: GAIT LEVEL OF ASSIST: REFUSED

## 2019-07-10 NOTE — PROGRESS NOTES
IV Iron Per Pharmacy Note  Patient Lean Body Weight:  50.1 kg (ideal body weight)  Reason for Iron Replacement: iron deficiency anemia & anemia of chronic disease    Lab Results   Component Value Date/Time    WBC 5.5 07/10/2019 04:38 AM    RBC 3.64 (L) 07/10/2019 04:38 AM    HEMOGLOBIN 9.2 (L) 07/10/2019 04:38 AM    HEMATOCRIT 30.1 (L) 07/10/2019 04:38 AM    MCV 82.7 07/10/2019 04:38 AM    MCH 25.3 (L) 07/10/2019 04:38 AM    MCHC 30.6 (L) 07/10/2019 04:38 AM    MPV 10.7 07/10/2019 04:38 AM     Recent Labs      07/10/19   0438   FERRITIN  168.4   IRON  <10*      Recent Labs      07/10/19   0438   CREATININE  0.87     Estimated Creatinine Clearance: 37.4 mL/min (by C-G formula based on SCr of 0.87 mg/dL).    Assessment/Plan:  1. IV Iron Indicated.   2. Give Iron Dextran 25 mg IV test dose following diphenhydramine/acetaminophen premeds over 30 minutes per protocol.  3. If no reaction (Anaphylaxis, Hypotension/Hypertension, N/V/D, Chest pain/Back Pain, Urticaria/Pruritis) in the next hour, proceed to full dose. Nursing to call the pharmacy IV room at ext. [phone] for full dose.  4. Full dose: Iron Dextran 1275 mg IV over 4 hours. Continue to monitor for delayed ADR including: Arthralgia/myalgia, Headache/backache, chills/dizziness/malaise, moderate to high fever and n/v.      Jt Draper, PharmD

## 2019-07-10 NOTE — PROGRESS NOTES
Pharmacy Kinetics 85 y.o. female on vancomycin day # 4  7/10/2019    Currently Dose: Vancomycin 900 mg iv q24hr (~15 mg/kg/dose)    Indication for Treatment: cellulitis  ID Service Following: No     Pertinent history per medical record: Admitted on 7/7/2019 for cellulitis/wound extremity of left lower extremity. Vascular consulted by admit provider due to concerns for necrotizing fasciiatis.      Other antibiotics: ampicillin/sulbactam 3 gm iv q6h     Allergies: Cephalexin [keflex]; Sulfa drugs; and Sulfamethoxazole w-trimethoprim      List concerns for accumulation of vancomycin: age 85, renal impairment, electrolyte derangement, BUN:SCr ~ 20:1     Pertinent cultures to date:   Results     Procedure Component Value Units Date/Time    GRAM STAIN [[id number]] Collected:  07/09/19 2240    Order Status:  Completed Specimen:  Wound Updated:  07/10/19 0614     Significant Indicator .     Source WND     Site LEFT ANKLE     Gram Stain Result Few WBCs.  Few Gram negative rods.      Narrative:       [id number] CARL AREVALO  [id number] CARL WILLIS R    CULTURE WOUND W/ GRAM STAIN [[id number]] Collected:  07/09/19 2240    Order Status:  Completed Specimen:  Wound from Left Ankle Updated:  07/09/19 2353    Narrative:       [id number] CARL WILLIS R    URINE CULTURE(NEW) [[id number]] Collected:  07/07/19 1650    Order Status:  Completed Specimen:  Urine Updated:  07/09/19 0706     Significant Indicator NEG     Source UR     Site -     Culture Result No growth at 48 hours.    Narrative:       Indication for culture:->Emergency Room Patient  If not done within the last 24 hours  Indication for culture:->Emergency Room Patient    BLOOD CULTURE [[id number]] Collected:  07/07/19 1325    Order Status:  Completed Specimen:  Blood from Peripheral Updated:  07/08/19 1005     Significant Indicator NEG     Source BLD     Site PERIPHERAL     Culture Result No Growth  Note: Blood cultures are incubated for 5 days  "and  are monitored continuously.Positive blood cultures  are called to the RN and reported as soon as  they are identified.      Narrative:       Per Hospital Policy: Only change Specimen Src: to \"Line\" if  specified by physician order.  Left Hand    BLOOD CULTURE [[id number]] Collected:  07/07/19 1325    Order Status:  Completed Specimen:  Blood from Peripheral Updated:  07/08/19 1005     Significant Indicator NEG     Source BLD     Site PERIPHERAL     Culture Result No Growth  Note: Blood cultures are incubated for 5 days and  are monitored continuously.Positive blood cultures  are called to the RN and reported as soon as  they are identified.      Narrative:       Per Hospital Policy: Only change Specimen Src: to \"Line\" if  specified by physician order.  Right AC    CULTURE WOUND W/ GRAM STAIN [[id number]]     Order Status:  Canceled Specimen:  Wound from Left Ankle     URINALYSIS [[id number]]  (Abnormal) Collected:  07/07/19 1650    Order Status:  Completed Specimen:  Urine Updated:  07/07/19 1723     Color Yellow     Character Clear     Specific Gravity 1.020     Ph 5.5     Glucose Negative mg/dL      Ketones Negative mg/dL      Protein 30 (A) mg/dL      Bilirubin Negative     Urobilinogen, Urine 0.2     Nitrite Negative     Leukocyte Esterase Trace (A)     Occult Blood Trace (A)     Micro Urine Req Microscopic    Narrative:       Indication for culture:->Emergency Room Patient  If not done within the last 24 hours    Blood Culture [[id number]]     Order Status:  Canceled Specimen:  Blood from Peripheral     Blood Culture [[id number]]     Order Status:  Canceled Specimen:  Blood from Peripheral     Urinalysis [[id number]]     Order Status:  Canceled Specimen:  Urine         MRSA nares swab if pneumonia is a concern (ordered/positive/negative/n-a): n/a    Recent Labs      07/07/19   1255  07/08/19   0258  07/09/19   0400  07/10/19   0438   WBC  7.9  7.8  6.0  5.5   NEUTSPOLYS  80.20*   --   70.10  62.70     Recent " "Labs      19   1255  19   0258  19   0400  07/10/19   0438   BUN  28*  24*  20  17   CREATININE  1.13  0.91  0.86  0.87   ALBUMIN  3.1*   --    --    --      Recent Labs      19   1211  19   1216   VANCORANDOM  9.3  9.1     Intake/Output Summary (Last 24 hours) at 07/10/19 0732  Last data filed at 07/10/19 0300   Gross per 24 hour   Intake              600 ml   Output              150 ml   Net              450 ml      /49   Pulse 61   Temp 36.8 °C (98.3 °F) (Temporal)   Resp 16   Ht 1.575 m (5' 2\")   Wt 58.5 kg (129 lb)   SpO2 91%  Temp (24hrs), Av.9 °C (98.4 °F), Min:36.2 °C (97.1 °F), Max:37.7 °C (99.9 °F)    Estimated Creatinine Clearance: 37.4 mL/min (by C-G formula based on SCr of 0.87 mg/dL).    A/P   1. Vancomycin dose change: not indicated   2. Next vancomycin level: 19 @1430 (ordered)  3. Goal trough: 12-16 mcg/mL  4. Comments: VS stable. Afebrile. WBC stable. CrCl ~37 mL/min (SCr stable). No new microbiology reporting.  Concerns for accumulation of vancomycin noted. Vancomycin level in place prior to PM dose 19 to assess clearance. BMP with AM labs. Pharmacy will continue to follow.    Jt Draper, PharmD  "

## 2019-07-10 NOTE — PROGRESS NOTES
Hospital Medicine Daily Progress Note    Date of Service  7/9/2019    Chief Complaint  Increasing lower extremity erythema, swelling, pain  Fatigue    Hospital Course   Ms. Mata is an 85-year-old female [facility] [place] for increasing left lower extremity erythema, edema, and pain, which was accompanied by increasing fatigue.  At the [facility] hospital she was diagnosed with cellulitis and lymphangitis, and given IV clindamycin and azithromycin prior to transfer here.  She does have a past medical history of moderate peripheral arterial disease and groin stents, and is usually followed by Dr. Walton of vascular surgery at a facility in [place].  She also has a chronic ulceration in that leg and has had previous MRSA infections.  Given the above findings there was concern for early necrotizing fasciitis so orthopedic surgery and vascular were both consulted.  Dr. Hollis evaluated the patient and felt there was no current indication for vascular intervention.  He recommended continuation of aggressive IV antibiotic therapy.  Consultation by orthopedic surgery is still pending.  Patient is currently receiving IV unasyn and vancomycin.  An x-ray of the ankle showed soft tissue swelling only.  Blood cultures are in process.      Interval Problem Update  Pain and tenderness still present in the left lower extremity, though her erythema seems to be improving already.  No drainage noted from her left ankle ulcer.  Has no other complaints and had no issues overnight.    No leukocytosis on this morning's lab work.  She does have microcytic anemia which we will work-up tomorrow morning.  BMP showed a minimally low potassium level of 3.5 in addition to a GFR of 59 (improved since admit).  Lactic acid levels remain normal.  Blood cultures in process.    T-max overnight 101.1 °F, HR 50s-80s, SBP 100s-130s, O2 saturations 96-99% on 1 L/min of oxygen via nasal  cannula.    7/7 BC NGTD.  Wound culture pending collection.     7/9:  BCs negative.  Left leg markings of cellulitis improved on thigh, however remain erythematous lower leg and distal anterior thigh.  Re-ordered wound cultures since discharge seen on b/l dorsal foot ulcers.  D/w bedside RN.  C/o muscle cramping bilateral legs, started on flexeril PRN.  Mild to moderate bilateral arterial stenosis seen on arterial u/s.  Ordered u/s venous to rule out dVT.Ordered SNF since both PT/OT recommending transitional care. dc'd IVFs 75/hr since eating well.  Hg stable at 10 to 9.4.  Ordered iron studies for a.m.      Consultants/Specialty  Orthopedic surgery  Vascular surgery    Code Status  Full code    Disposition  PT/OT rec SNF.  SNF order placed.  Moving from [place] to [place], [place] to be with her son.     Review of Systems  Review of Systems   Constitutional: Negative for chills, fever and malaise/fatigue.   HENT: Negative for congestion, sinus pain and sore throat.    Respiratory: Negative for cough, shortness of breath and wheezing.    Cardiovascular: Positive for leg swelling (Left lower extremity, right foot). Negative for chest pain, palpitations, orthopnea, claudication and PND.   Gastrointestinal: Negative for abdominal pain, blood in stool, constipation, diarrhea, nausea and vomiting.   Genitourinary: Negative for dysuria, frequency and urgency.   Musculoskeletal:        Left lower extremity pain and tenderness.   Neurological: Negative for dizziness, tingling, sensory change, speech change, focal weakness, weakness and headaches.   Endo/Heme/Allergies: Does not bruise/bleed easily.   Psychiatric/Behavioral: Negative for depression. The patient is not nervous/anxious and does not have insomnia.    All other systems reviewed and are negative.     Physical Exam  Temp:  [36.2 °C (97.1 °F)-37.9 °C (100.2 °F)] 36.8 °C (98.3 °F)  Pulse:  [61-68] 61  Resp:  [14-18] 14  BP: (109-143)/(60-75) 109/75  SpO2:  [92 %-94 %]  93 %    Physical Exam   Constitutional: She is oriented to person, place, and time. She appears well-developed and well-nourished. She is active and cooperative. She appears ill. No distress.   HENT:   Head: Normocephalic and atraumatic.   Eyes: Pupils are equal, round, and reactive to light. Conjunctivae are normal. Right eye exhibits no discharge. Left eye exhibits no discharge. No scleral icterus.   Neck: Normal range of motion. Neck supple. No JVD present.   Cardiovascular: Normal rate, regular rhythm, normal heart sounds and intact distal pulses.  Exam reveals no gallop and no friction rub.    No murmur heard.  Pulses:       Dorsalis pedis pulses are 1+ on the right side, and 1+ on the left side.        Posterior tibial pulses are 1+ on the right side, and 1+ on the left side.   Pulmonary/Chest: Effort normal and breath sounds normal. No accessory muscle usage or stridor. No respiratory distress. She has no decreased breath sounds. She has no wheezes. She has no rhonchi. She has no rales.   Abdominal: Soft. She exhibits no distension. Bowel sounds are decreased. There is no tenderness. There is no rebound and no guarding.   Musculoskeletal: Normal range of motion. She exhibits no edema.   Neurological: She is alert and oriented to person, place, and time. No cranial nerve deficit or sensory deficit. GCS eye subscore is 4. GCS verbal subscore is 5. GCS motor subscore is 6.   Skin: Skin is warm and dry. No rash noted. She is not diaphoretic. There is erythema. No pallor.        Psychiatric: She has a normal mood and affect. Her speech is normal and behavior is normal. Judgment and thought content normal. Cognition and memory are normal.   Nursing note and vitals reviewed.    Fluids    Intake/Output Summary (Last 24 hours) at 07/09/19 2048  Last data filed at 07/09/19 1700   Gross per 24 hour   Intake              600 ml   Output                0 ml   Net              600 ml     Laboratory  Recent Labs       07/07/19   1255  07/08/19   0258  07/09/19   0400   WBC  7.9  7.8  6.0   RBC  4.34  4.01*  3.75*   HEMOGLOBIN  11.5*  10.4*  9.8*   HEMATOCRIT  35.2*  32.5*  29.9*   MCV  81.1*  81.0*  79.7*   MCH  26.5*  25.9*  26.1*   MCHC  32.7*  32.0*  32.8*   RDW  43.1  43.2  42.4   PLATELETCT  177  178  195   MPV  10.2  10.7  10.6     Recent Labs      07/07/19   1255  07/08/19   0258  07/09/19   0400   SODIUM  132*  136  136   POTASSIUM  3.4*  3.5*  3.7   CHLORIDE  101  105  107   CO2  23  22  22   GLUCOSE  89  125*  111*   BUN  28*  24*  20   CREATININE  1.13  0.91  0.86   CALCIUM  9.1  8.6  8.9     Recent Labs      07/07/19   1255   APTT  36.6*   INR  1.00     Imaging  US-EXTREMITY ARTERY LOWER BILAT   Final Result      US-MARY SINGLE LEVEL BILAT   Final Result      US-EXTREMITY ARTERY LOWER BILAT W/MARY (COMBO)   Final Result      DX-ANKLE 3+ VIEWS LEFT   Final Result      No definite acute osseous abnormality but the evaluation limited due to osseous demineralization.      Diffuse soft tissue swelling.      DX-CHEST-PORTABLE (1 VIEW)   Final Result         1. No acute cardiopulmonary abnormalities are identified.      US-EXTREMITY VENOUS LOWER BILAT    (Results Pending)      Assessment/Plan  * Cellulitis of left lower extremity- (present on admission)   Assessment & Plan    No leukocytosis, lactic acid remains normal.  No current concern for necrotizing fasciitis.  X-ray of the left ankle showed soft tissue swelling only.  Pulses 1+ at best.  Continue IV antibiotics and pain control.  Blood cultures negative x2  Re-ordered wound culture of LLE ulceration since drainage seen.  vascular surgery has been consulted.  U/s venous ordered.  Mild to moderate arterial stenosis seen on arterial u/s.     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  Attempt to obtain wound culture.  Mild to moderate arterial stenosis seen on arterial u/s  plavix  Heparin tid      Anemia   Assessment & Plan    Anemia work up ordered.     PAD (peripheral artery disease) (HCC)- (present on admission)   Assessment & Plan    Has moderate PAD.  Evaluated by Dr. Heaton of vascular surgery who was recommended against any type of surgical intervention.  Wound care consult for chronic ulcers.  plavix daily.     Essential hypertension- (present on admission)   Assessment & Plan    Well-controlled on current regimen.  SBP 100s-130s.  Continue home amlodipine.        VTE prophylaxis: Subcutaneous heparin

## 2019-07-10 NOTE — ASSESSMENT & PLAN NOTE
Iron <10, ferritin 150.  Ordered IV dextran replacement.  No obvious blood loss noted.  Stable Hg at 9.  Changed heparin to lovenox daily for patient comfort.

## 2019-07-10 NOTE — CARE PLAN
Problem: Safety  Goal: Will remain free from falls  Outcome: PROGRESSING AS EXPECTED      Problem: Infection  Goal: Will remain free from infection  Outcome: PROGRESSING SLOWER THAN EXPECTED      Problem: Pain Management  Goal: Pain level will decrease to patient's comfort goal  Outcome: PROGRESSING SLOWER THAN EXPECTED

## 2019-07-10 NOTE — PROGRESS NOTES
Hospital Medicine Daily Progress Note    Date of Service  7/10/2019    Chief Complaint  Increasing lower extremity erythema, swelling, pain  Fatigue    Hospital Course   Ms. Mata is an 85-year-old female [facility] [place] for increasing left lower extremity erythema, edema, and pain, which was accompanied by increasing fatigue.  At the [facility] hospital she was diagnosed with cellulitis and lymphangitis, and given IV clindamycin and azithromycin prior to transfer here.  She does have a past medical history of moderate peripheral arterial disease and groin stents, and is usually followed by Dr. Walton of vascular surgery at a facility in [place].  She also has a chronic ulceration in that leg and has had previous MRSA infections.  Given the above findings there was concern for early necrotizing fasciitis so orthopedic surgery and vascular were both consulted.  Dr. Hollis evaluated the patient and felt there was no current indication for vascular intervention.  He recommended continuation of aggressive IV antibiotic therapy.  Consultation by orthopedic surgery is still pending.  Patient is currently receiving IV unasyn and vancomycin.  An x-ray of the ankle showed soft tissue swelling only.  Blood cultures are in process.      Interval Problem Update  Pain and tenderness still present in the left lower extremity, though her erythema seems to be improving already.  No drainage noted from her left ankle ulcer.  Has no other complaints and had no issues overnight.    No leukocytosis on this morning's lab work.  She does have microcytic anemia which we will work-up tomorrow morning.  BMP showed a minimally low potassium level of 3.5 in addition to a GFR of 59 (improved since admit).  Lactic acid levels remain normal.  Blood cultures in process.    T-max overnight 101.1 °F, HR 50s-80s, SBP 100s-130s, O2 saturations 96-99% on 1 L/min of oxygen via nasal  cannula.    7/7 BC NGTD.  Wound culture pending collection.     7/9:  BCs negative.  Left leg markings of cellulitis improved on thigh, however remain erythematous lower leg and distal anterior thigh.  Re-ordered wound cultures since discharge seen on b/l dorsal foot ulcers.  D/w bedside RN.  C/o muscle cramping bilateral legs, started on flexeril PRN.  Mild to moderate bilateral arterial stenosis seen on arterial u/s.  Ordered u/s venous to rule out dVT.Ordered SNF since both PT/OT recommending transitional care. dc'd IVFs 75/hr since eating well.  Hg stable at 10 to 9.4.  Ordered iron studies for a.m.  7/10:  Wound culture with gram positive rods, ID and ss pending.  Improvement seen in left thigh, remains with erythema lower left leg.  Iron <10, replacement with IV dextran.   Added norco prn pain, patient states only getting tylenol prn.  dc'd oxycodone and iv dilaudid.      Consultants/Specialty  Orthopedic surgery  Vascular surgery    Code Status  Full code    Disposition  PT/OT rec SNF.  SNF order placed.  Moving from [place] to [place], [place] to be with her son.     Review of Systems  Review of Systems   Constitutional: Negative for chills, fever and malaise/fatigue.   HENT: Negative for congestion, sinus pain and sore throat.    Respiratory: Negative for cough, shortness of breath and wheezing.    Cardiovascular: Positive for leg swelling (Left lower extremity, right foot). Negative for chest pain, palpitations, orthopnea, claudication and PND.   Gastrointestinal: Negative for abdominal pain, blood in stool, constipation, diarrhea, nausea and vomiting.   Genitourinary: Negative for dysuria, frequency and urgency.   Musculoskeletal:        Left lower extremity pain and tenderness.   Neurological: Negative for dizziness, tingling, sensory change, speech change, focal weakness, weakness and headaches.   Endo/Heme/Allergies: Does not bruise/bleed easily.   Psychiatric/Behavioral: Negative for depression. The  patient is not nervous/anxious and does not have insomnia.    All other systems reviewed and are negative.     Physical Exam  Temp:  [36.7 °C (98 °F)-37.7 °C (99.9 °F)] 36.7 °C (98 °F)  Pulse:  [61-91] 70  Resp:  [14-16] 14  BP: (109-135)/(49-75) 133/55  SpO2:  [91 %-95 %] 95 %    Physical Exam   Constitutional: She is oriented to person, place, and time. She appears well-developed and well-nourished. She is active and cooperative. She appears ill. No distress.   HENT:   Head: Normocephalic and atraumatic.   Eyes: Pupils are equal, round, and reactive to light. Conjunctivae are normal. Right eye exhibits no discharge. Left eye exhibits no discharge. No scleral icterus.   Neck: Normal range of motion. Neck supple. No JVD present.   Cardiovascular: Normal rate, regular rhythm, normal heart sounds and intact distal pulses.  Exam reveals no gallop and no friction rub.    No murmur heard.  Pulses:       Dorsalis pedis pulses are 1+ on the right side, and 1+ on the left side.        Posterior tibial pulses are 1+ on the right side, and 1+ on the left side.   Pulmonary/Chest: Effort normal and breath sounds normal. No accessory muscle usage or stridor. No respiratory distress. She has no decreased breath sounds. She has no wheezes. She has no rhonchi. She has no rales.   Abdominal: Soft. She exhibits no distension. Bowel sounds are decreased. There is no tenderness. There is no rebound and no guarding.   Musculoskeletal: Normal range of motion. She exhibits no edema.   Neurological: She is alert and oriented to person, place, and time. No cranial nerve deficit or sensory deficit. GCS eye subscore is 4. GCS verbal subscore is 5. GCS motor subscore is 6.   Skin: Skin is warm and dry. No rash noted. She is not diaphoretic. There is erythema. No pallor.        Psychiatric: She has a normal mood and affect. Her speech is normal and behavior is normal. Judgment and thought content normal. Cognition and memory are normal.    Nursing note and vitals reviewed.    Fluids    Intake/Output Summary (Last 24 hours) at 07/10/19 1549  Last data filed at 07/10/19 1300   Gross per 24 hour   Intake              600 ml   Output              150 ml   Net              450 ml     Laboratory  Recent Labs      07/08/19 0258 07/09/19   0400  07/10/19   0438   WBC  7.8  6.0  5.5   RBC  4.01*  3.75*  3.64*   HEMOGLOBIN  10.4*  9.8*  9.2*   HEMATOCRIT  32.5*  29.9*  30.1*   MCV  81.0*  79.7*  82.7   MCH  25.9*  26.1*  25.3*   MCHC  32.0*  32.8*  30.6*   RDW  43.2  42.4  45.3   PLATELETCT  178  195  216   MPV  10.7  10.6  10.7     Recent Labs      07/08/19 0258 07/09/19   0400  07/10/19   0438   SODIUM  136  136  135   POTASSIUM  3.5*  3.7  3.8   CHLORIDE  105  107  107   CO2  22  22  21   GLUCOSE  125*  111*  98   BUN  24*  20  17   CREATININE  0.91  0.86  0.87   CALCIUM  8.6  8.9  8.7         Imaging  US-EXTREMITY ARTERY LOWER BILAT   Final Result      US-MARY SINGLE LEVEL BILAT   Final Result      US-EXTREMITY ARTERY LOWER BILAT W/MARY (COMBO)   Final Result      DX-ANKLE 3+ VIEWS LEFT   Final Result      No definite acute osseous abnormality but the evaluation limited due to osseous demineralization.      Diffuse soft tissue swelling.      DX-CHEST-PORTABLE (1 VIEW)   Final Result         1. No acute cardiopulmonary abnormalities are identified.      US-EXTREMITY VENOUS LOWER BILAT    (Results Pending)      Assessment/Plan  * Cellulitis of left lower extremity- (present on admission)   Assessment & Plan    No leukocytosis, lactic acid remains normal.  No current concern for necrotizing fasciitis.  X-ray of the left ankle showed soft tissue swelling only.  Pulses 1+ at best.  Continue IV antibiotics and pain control.  Blood cultures negative x2  7/9 wound culture of LLE ulceration since drainage pending.  vascular surgery has been consulted.  U/s venous ordered.  Mild to moderate arterial stenosis seen on arterial u/s.     Chronic ulcer of bilateral  ankles due to PVD ([facility])- (present on admission)   Assessment & Plan    Wound care ordered.  Attempt to obtain wound culture.  Mild to moderate arterial stenosis seen on arterial u/s  plavix  Heparin tid     Iron deficiency anemia   Assessment & Plan    Iron <10, ferritin 150.  Ordered IV dextran replacement.  No obvious blood loss noted.  Stable Hg at 9.  Changed heparin to lovenox daily for patient comfort.     PAD (peripheral artery disease) ([facility])- (present on admission)   Assessment & Plan    Has moderate PAD.  Evaluated by Dr. Heaton of vascular surgery who was recommended against any type of surgical intervention.  Wound care consult for chronic ulcers.  plavix daily.     Essential hypertension- (present on admission)   Assessment & Plan    Well-controlled on current regimen.  SBP 100s-130s.  Continue home amlodipine.        VTE prophylaxis: Subcutaneous heparin

## 2019-07-10 NOTE — DISCHARGE PLANNING
Anticipated Discharge Disposition: SNF    Action: LSW met with the Pt and discussed SNF choice. Pt stated she want to discuss SNF with her son before she makes a decision. LSW provided Pt with SNF choice form.    Barriers to Discharge: SNF choice    Plan: Follow up on SNF choice

## 2019-07-10 NOTE — THERAPY
"Occupational Therapy Treatment completed with focus on ADLs, ADL transfers, patient education and upper extremity function.  Plan of Care: Will benefit from Occupational Therapy 3 times per week  Discharge Recommendations:  Equipment Will Continue to Assess for Equipment Needs. Post-acute therapy Recommend post-acute placement for additional occupational therapy services prior to discharge home. Patient can tolerate post-acute therapies at a 5x/week frequency.    Patient seen for OT treat focused on EOB seated ADLs / therex and Min A FWW side stepping. Patient report limited by L LE pain and request to end session by returning to bed. Patient would benefit from continued skilled OT in this setting followed by post acute placement.     See \"Rehab Therapy-Acute\" Patient Summary Report for complete documentation.   "

## 2019-07-11 LAB
ANION GAP SERPL CALC-SCNC: 9 MMOL/L (ref 0–11.9)
BUN SERPL-MCNC: 18 MG/DL (ref 8–22)
CALCIUM SERPL-MCNC: 8.9 MG/DL (ref 8.5–10.5)
CHLORIDE SERPL-SCNC: 108 MMOL/L (ref 96–112)
CO2 SERPL-SCNC: 17 MMOL/L (ref 20–33)
CREAT SERPL-MCNC: 0.81 MG/DL (ref 0.5–1.4)
EKG IMPRESSION: NORMAL
GLUCOSE SERPL-MCNC: 113 MG/DL (ref 65–99)
POTASSIUM SERPL-SCNC: 4 MMOL/L (ref 3.6–5.5)
SODIUM SERPL-SCNC: 134 MMOL/L (ref 135–145)

## 2019-07-11 PROCEDURE — A9270 NON-COVERED ITEM OR SERVICE: HCPCS | Performed by: HOSPITALIST

## 2019-07-11 PROCEDURE — A9270 NON-COVERED ITEM OR SERVICE: HCPCS | Performed by: INTERNAL MEDICINE

## 2019-07-11 PROCEDURE — 700111 HCHG RX REV CODE 636 W/ 250 OVERRIDE (IP): Performed by: HOSPITALIST

## 2019-07-11 PROCEDURE — 770006 HCHG ROOM/CARE - MED/SURG/GYN SEMI*

## 2019-07-11 PROCEDURE — 80048 BASIC METABOLIC PNL TOTAL CA: CPT

## 2019-07-11 PROCEDURE — 36415 COLL VENOUS BLD VENIPUNCTURE: CPT

## 2019-07-11 PROCEDURE — 700105 HCHG RX REV CODE 258: Performed by: HOSPITALIST

## 2019-07-11 PROCEDURE — 99232 SBSQ HOSP IP/OBS MODERATE 35: CPT | Performed by: HOSPITALIST

## 2019-07-11 PROCEDURE — 700102 HCHG RX REV CODE 250 W/ 637 OVERRIDE(OP): Performed by: HOSPITALIST

## 2019-07-11 PROCEDURE — 700102 HCHG RX REV CODE 250 W/ 637 OVERRIDE(OP): Performed by: INTERNAL MEDICINE

## 2019-07-11 RX ORDER — AMOXICILLIN AND CLAVULANATE POTASSIUM 875; 125 MG/1; MG/1
1 TABLET, FILM COATED ORAL EVERY 12 HOURS
Status: CANCELLED | OUTPATIENT
Start: 2019-07-11

## 2019-07-11 RX ORDER — DOXYCYCLINE 100 MG/1
100 TABLET ORAL EVERY 12 HOURS
Status: DISCONTINUED | OUTPATIENT
Start: 2019-07-11 | End: 2019-07-16 | Stop reason: HOSPADM

## 2019-07-11 RX ADMIN — CLOPIDOGREL BISULFATE 75 MG: 75 TABLET ORAL at 05:46

## 2019-07-11 RX ADMIN — AMPICILLIN AND SULBACTAM 3 G: 1; 2 INJECTION, POWDER, FOR SOLUTION INTRAMUSCULAR; INTRAVENOUS at 18:49

## 2019-07-11 RX ADMIN — ENOXAPARIN SODIUM 40 MG: 100 INJECTION SUBCUTANEOUS at 18:49

## 2019-07-11 RX ADMIN — SENNOSIDES, DOCUSATE SODIUM 2 TABLET: 50; 8.6 TABLET, FILM COATED ORAL at 05:46

## 2019-07-11 RX ADMIN — DOXYCYCLINE 100 MG: 100 TABLET, FILM COATED ORAL at 11:48

## 2019-07-11 RX ADMIN — DOXYCYCLINE 100 MG: 100 TABLET, FILM COATED ORAL at 18:49

## 2019-07-11 RX ADMIN — AMPICILLIN AND SULBACTAM 3 G: 1; 2 INJECTION, POWDER, FOR SOLUTION INTRAMUSCULAR; INTRAVENOUS at 03:34

## 2019-07-11 RX ADMIN — ACETAMINOPHEN 650 MG: 325 TABLET, FILM COATED ORAL at 20:32

## 2019-07-11 RX ADMIN — AMPICILLIN AND SULBACTAM 3 G: 1; 2 INJECTION, POWDER, FOR SOLUTION INTRAMUSCULAR; INTRAVENOUS at 23:30

## 2019-07-11 RX ADMIN — AMPICILLIN AND SULBACTAM 3 G: 1; 2 INJECTION, POWDER, FOR SOLUTION INTRAMUSCULAR; INTRAVENOUS at 09:16

## 2019-07-11 RX ADMIN — CYCLOBENZAPRINE HYDROCHLORIDE 10 MG: 10 TABLET, FILM COATED ORAL at 03:37

## 2019-07-11 RX ADMIN — AMLODIPINE BESYLATE 10 MG: 10 TABLET ORAL at 05:46

## 2019-07-11 RX ADMIN — Medication 1 CAPSULE: at 09:16

## 2019-07-11 RX ADMIN — AMPICILLIN AND SULBACTAM 3 G: 1; 2 INJECTION, POWDER, FOR SOLUTION INTRAMUSCULAR; INTRAVENOUS at 14:00

## 2019-07-11 ASSESSMENT — ENCOUNTER SYMPTOMS
FOCAL WEAKNESS: 0
VOMITING: 0
DIARRHEA: 0
CLAUDICATION: 0
TINGLING: 0
DIZZINESS: 0
BRUISES/BLEEDS EASILY: 0
PALPITATIONS: 0
CONSTIPATION: 0
FEVER: 0
DEPRESSION: 0
INSOMNIA: 0
NERVOUS/ANXIOUS: 0
ORTHOPNEA: 0
BLOOD IN STOOL: 0
SPEECH CHANGE: 0
HEADACHES: 0
SORE THROAT: 0
PND: 0
NAUSEA: 0
SINUS PAIN: 0
WEAKNESS: 0
ABDOMINAL PAIN: 0
CHILLS: 0
SENSORY CHANGE: 0
SHORTNESS OF BREATH: 0
WHEEZING: 0
COUGH: 0

## 2019-07-11 NOTE — DISCHARGE PLANNING
Agency/Facility Name: [facility]  Spoke To: Saurabh  Outcome: Referral under review. Saurabh will have an answer before EOD.

## 2019-07-11 NOTE — THERAPY
"Physical Therapy Treatment completed.   Bed Mobility:  Supine to Sit: Minimal Assist  Transfers: Sit to Stand: Minimal Assist  Gait: Level Of Assist: Refused, unable due to pain   Plan of Care: Will benefit from Physical Therapy 3 times per week  Discharge Recommendations: Equipment: Will Continue to Assess for Equipment Needs. Post-acute therapy Recommend post-acute placement for continued physical therapy services prior to discharge home. Patient can tolerate post-acute therapies at a 5x/week frequency.         See \"Rehab Therapy-Acute\" Patient Summary Report for complete documentation.     Pt was pleasant and agreeable to therapy session. Continues to be very limtied by pain in LLE. She is maintaining NWB by preference at this time. She performed multiple sit to stands with Bettina and fww. As well performed stnad pivot transfers but deferred trialing gait. [facility] Will continue to follow while in house to progress mobility.   "

## 2019-07-11 NOTE — PROGRESS NOTES
Hospital Medicine Daily Progress Note    Date of Service  7/11/2019    Chief Complaint  Increasing lower extremity erythema, swelling, pain  Fatigue    Hospital Course   Ms. Mata is an 85-year-old female [facility] [place] for increasing left lower extremity erythema, edema, and pain, which was accompanied by increasing fatigue.  At the [facility] hospital she was diagnosed with cellulitis and lymphangitis, and given IV clindamycin and azithromycin prior to transfer here.  She does have a past medical history of moderate peripheral arterial disease and groin stents, and is usually followed by Dr. Walton of vascular surgery at a facility in [place].  She also has a chronic ulceration in that leg and has had previous MRSA infections.  Given the above findings there was concern for early necrotizing fasciitis so orthopedic surgery and vascular were both consulted.  Dr. Hollis evaluated the patient and felt there was no current indication for vascular intervention.  He recommended continuation of aggressive IV antibiotic therapy.  Consultation by orthopedic surgery is still pending.  Patient is currently receiving IV unasyn and vancomycin.  An x-ray of the ankle showed soft tissue swelling only.  Blood cultures are in process.      Interval Problem Update  Pain and tenderness still present in the left lower extremity, though her erythema seems to be improving already.  No drainage noted from her left ankle ulcer.  Has no other complaints and had no issues overnight.    No leukocytosis on this morning's lab work.  She does have microcytic anemia which we will work-up tomorrow morning.  BMP showed a minimally low potassium level of 3.5 in addition to a GFR of 59 (improved since admit).  Lactic acid levels remain normal.  Blood cultures in process.    T-max overnight 101.1 °F, HR 50s-80s, SBP 100s-130s, O2 saturations 96-99% on 1 L/min of oxygen via nasal  cannula.    7/7 BC NGTD.  Wound culture pending collection.     7/9:  BCs negative.  Left leg markings of cellulitis improved on thigh, however remain erythematous lower leg and distal anterior thigh.  Re-ordered wound cultures since discharge seen on b/l dorsal foot ulcers.  D/w bedside RN.  C/o muscle cramping bilateral legs, started on flexeril PRN.  Mild to moderate bilateral arterial stenosis seen on arterial u/s.  Ordered u/s venous to rule out dVT.Ordered SNF since both PT/OT recommending transitional care. dc'd IVFs 75/hr since eating well.  Hg stable at 10 to 9.4.  Ordered iron studies for a.m.  7/10:  Wound culture with gram positive rods, ID and ss pending.  Improvement seen in left thigh, remains with erythema lower left leg.  Iron <10, replacement with IV dextran.   Added norco prn pain, patient states only getting tylenol prn.  dc'd oxycodone and iv dilaudid.   7/11:  Cellulitis looks remarkably improved.  WC still pending ID and SS.  Changed vancomycin to doxycycline.  Continue unasyn until cultures results.  U/s negative for DVT.  Dc isolation since no evidence for MRSA on wound culture.     Consultants/Specialty  Orthopedic surgery  Vascular surgery    Code Status  Full code    Disposition  PT/OT rec SNF.  SNF order placed.  Moving from [place] to [place], [place] to be with her son.     Review of Systems  Review of Systems   Constitutional: Negative for chills, fever and malaise/fatigue.   HENT: Negative for congestion, sinus pain and sore throat.    Respiratory: Negative for cough, shortness of breath and wheezing.    Cardiovascular: Positive for leg swelling (Left lower extremity, right foot). Negative for chest pain, palpitations, orthopnea, claudication and PND.   Gastrointestinal: Negative for abdominal pain, blood in stool, constipation, diarrhea, nausea and vomiting.   Genitourinary: Negative for dysuria, frequency and urgency.   Musculoskeletal:        Left lower extremity pain and tenderness.    Neurological: Negative for dizziness, tingling, sensory change, speech change, focal weakness, weakness and headaches.   Endo/Heme/Allergies: Does not bruise/bleed easily.   Psychiatric/Behavioral: Negative for depression. The patient is not nervous/anxious and does not have insomnia.    All other systems reviewed and are negative.     Physical Exam  Temp:  [36.3 °C (97.4 °F)-37.5 °C (99.5 °F)] 37.1 °C (98.7 °F)  Pulse:  [66-75] 71  Resp:  [15-18] 15  BP: (121-140)/(44-57) 122/57  SpO2:  [89 %-96 %] 92 %    Physical Exam   Constitutional: She is oriented to person, place, and time. She appears well-developed and well-nourished. She is active and cooperative. She appears ill. No distress.   HENT:   Head: Normocephalic and atraumatic.   Eyes: Pupils are equal, round, and reactive to light. Conjunctivae are normal. Right eye exhibits no discharge. Left eye exhibits no discharge. No scleral icterus.   Neck: Normal range of motion. Neck supple. No JVD present.   Cardiovascular: Normal rate, regular rhythm, normal heart sounds and intact distal pulses.  Exam reveals no gallop and no friction rub.    No murmur heard.  Pulses:       Dorsalis pedis pulses are 1+ on the right side, and 1+ on the left side.        Posterior tibial pulses are 1+ on the right side, and 1+ on the left side.   Pulmonary/Chest: Effort normal and breath sounds normal. No accessory muscle usage or stridor. No respiratory distress. She has no decreased breath sounds. She has no wheezes. She has no rhonchi. She has no rales.   Abdominal: Soft. She exhibits no distension. Bowel sounds are decreased. There is no tenderness. There is no rebound and no guarding.   Musculoskeletal: Normal range of motion. She exhibits no edema.   Neurological: She is alert and oriented to person, place, and time. No cranial nerve deficit or sensory deficit. GCS eye subscore is 4. GCS verbal subscore is 5. GCS motor subscore is 6.   Skin: Skin is warm and dry. No rash  noted. She is not diaphoretic. There is erythema. No pallor.        Psychiatric: She has a normal mood and affect. Her speech is normal and behavior is normal. Judgment and thought content normal. Cognition and memory are normal.   Nursing note and vitals reviewed.    Fluids    Intake/Output Summary (Last 24 hours) at 07/11/19 1659  Last data filed at 07/11/19 0800   Gross per 24 hour   Intake              480 ml   Output                0 ml   Net              480 ml     Laboratory  Recent Labs      07/09/19   0400  07/10/19   0438   WBC  6.0  5.5   RBC  3.75*  3.64*   HEMOGLOBIN  9.8*  9.2*   HEMATOCRIT  29.9*  30.1*   MCV  79.7*  82.7   MCH  26.1*  25.3*   MCHC  32.8*  30.6*   RDW  42.4  45.3   PLATELETCT  195  216   MPV  10.6  10.7     Recent Labs      07/09/19   0400  07/10/19   0438  07/11/19   0347   SODIUM  136  135  134*   POTASSIUM  3.7  3.8  4.0   CHLORIDE  107  107  108   CO2  22  21  17*   GLUCOSE  111*  98  113*   BUN  20  17  18   CREATININE  0.86  0.87  0.81   CALCIUM  8.9  8.7  8.9         Imaging  US-EXTREMITY VENOUS LOWER BILAT   Final Result      US-EXTREMITY ARTERY LOWER BILAT   Final Result      US-MARY SINGLE LEVEL BILAT   Final Result      US-EXTREMITY ARTERY LOWER BILAT W/MARY (COMBO)   Final Result      DX-ANKLE 3+ VIEWS LEFT   Final Result      No definite acute osseous abnormality but the evaluation limited due to osseous demineralization.      Diffuse soft tissue swelling.      DX-CHEST-PORTABLE (1 VIEW)   Final Result         1. No acute cardiopulmonary abnormalities are identified.         Assessment/Plan  * Cellulitis of left lower extremity- (present on admission)   Assessment & Plan    No leukocytosis, lactic acid remains normal.  No current concern for necrotizing fasciitis.  X-ray of the left ankle showed soft tissue swelling only.  Pulses 1+ at best.  Continue IV antibiotics and pain control.  Blood cultures negative x2  7/9 wound culture of LLE ulceration since drainage  pending.  vascular surgery has been consulted.  U/s venous negative.  Mild to moderate arterial stenosis seen on arterial u/s.     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  Attempt to obtain wound culture.  Mild to moderate arterial stenosis seen on arterial u/s  plavix  Heparin tid     Iron deficiency anemia- (present on admission)   Assessment & Plan    Iron <10, ferritin 150.  Ordered IV dextran replacement.  No obvious blood loss noted.  Stable Hg at 9.  Changed heparin to lovenox daily for patient comfort.     PAD (peripheral artery disease) ([facility])- (present on admission)   Assessment & Plan    Has moderate PAD.  Evaluated by Dr. Heaton of vascular surgery who was recommended against any type of surgical intervention.  Wound care consult for chronic ulcers.  plavix daily.     Essential hypertension- (present on admission)   Assessment & Plan    Well-controlled on current regimen.  SBP 100s-130s.  Continue home amlodipine.        VTE prophylaxis:  lovenox

## 2019-07-11 NOTE — DISCHARGE PLANNING
Anticipated Discharge Disposition: SNF    Action: LSW spoke with Pt son who stated if possible he would like the Pt closer to them in [place] or [place]. LSW spoke with the Pt who gave verbal consent stating she would like a SNF as close to her son as possible and [facility] and [facility].     Barriers to Discharge: SNF acceptance    Plan: Follow up on SNF referrals

## 2019-07-11 NOTE — CARE PLAN
Problem: Safety  Goal: Will remain free from falls  Outcome: PROGRESSING AS EXPECTED      Problem: Infection  Goal: Will remain free from infection  Outcome: NOT MET  MRSA present in wounds. Cellulitis still being treated.     Problem: Pain Management  Goal: Pain level will decrease to patient's comfort goal  Outcome: PROGRESSING SLOWER THAN EXPECTED

## 2019-07-11 NOTE — DISCHARGE PLANNING
Received Choice form at 1023  Agency/Facility Name: [facility], [facility]  Referral sent per Choice form @ 0170

## 2019-07-11 NOTE — PROGRESS NOTES
Vascular Surgery     Improving with resolving erythema leg    Continue Abx therapy     Follow-up with patient's established vascular surgeon for continued management of venous ulcer.     Call if needed     Sign off         Red Heaton MD

## 2019-07-12 ENCOUNTER — APPOINTMENT (OUTPATIENT)
Dept: RADIOLOGY | Facility: MEDICAL CENTER | Age: 84
DRG: 602 | End: 2019-07-12
Attending: HOSPITALIST
Payer: MEDICARE

## 2019-07-12 PROBLEM — J81.0 ACUTE PULMONARY EDEMA (HCC): Status: ACTIVE | Noted: 2019-07-12

## 2019-07-12 LAB
BACTERIA BLD CULT: NORMAL
BACTERIA BLD CULT: NORMAL
SIGNIFICANT IND 70042: NORMAL
SIGNIFICANT IND 70042: NORMAL
SITE SITE: NORMAL
SITE SITE: NORMAL
SOURCE SOURCE: NORMAL
SOURCE SOURCE: NORMAL

## 2019-07-12 PROCEDURE — 700105 HCHG RX REV CODE 258

## 2019-07-12 PROCEDURE — 700102 HCHG RX REV CODE 250 W/ 637 OVERRIDE(OP): Performed by: HOSPITALIST

## 2019-07-12 PROCEDURE — 770006 HCHG ROOM/CARE - MED/SURG/GYN SEMI*

## 2019-07-12 PROCEDURE — 97110 THERAPEUTIC EXERCISES: CPT

## 2019-07-12 PROCEDURE — 99232 SBSQ HOSP IP/OBS MODERATE 35: CPT | Performed by: HOSPITALIST

## 2019-07-12 PROCEDURE — 97530 THERAPEUTIC ACTIVITIES: CPT

## 2019-07-12 PROCEDURE — 700105 HCHG RX REV CODE 258: Performed by: HOSPITALIST

## 2019-07-12 PROCEDURE — 700102 HCHG RX REV CODE 250 W/ 637 OVERRIDE(OP): Performed by: INTERNAL MEDICINE

## 2019-07-12 PROCEDURE — A9270 NON-COVERED ITEM OR SERVICE: HCPCS | Performed by: INTERNAL MEDICINE

## 2019-07-12 PROCEDURE — 700111 HCHG RX REV CODE 636 W/ 250 OVERRIDE (IP): Performed by: HOSPITALIST

## 2019-07-12 PROCEDURE — 97535 SELF CARE MNGMENT TRAINING: CPT

## 2019-07-12 PROCEDURE — A9270 NON-COVERED ITEM OR SERVICE: HCPCS | Performed by: HOSPITALIST

## 2019-07-12 PROCEDURE — 71045 X-RAY EXAM CHEST 1 VIEW: CPT

## 2019-07-12 RX ORDER — AMOXICILLIN AND CLAVULANATE POTASSIUM 875; 125 MG/1; MG/1
1 TABLET, FILM COATED ORAL EVERY 12 HOURS
Status: DISCONTINUED | OUTPATIENT
Start: 2019-07-12 | End: 2019-07-16 | Stop reason: HOSPADM

## 2019-07-12 RX ORDER — SODIUM CHLORIDE 9 MG/ML
INJECTION, SOLUTION INTRAVENOUS
Status: COMPLETED
Start: 2019-07-12 | End: 2019-07-12

## 2019-07-12 RX ORDER — FUROSEMIDE 10 MG/ML
20 INJECTION INTRAMUSCULAR; INTRAVENOUS
Status: DISCONTINUED | OUTPATIENT
Start: 2019-07-12 | End: 2019-07-13

## 2019-07-12 RX ORDER — FUROSEMIDE 20 MG/1
20 TABLET ORAL
Status: DISCONTINUED | OUTPATIENT
Start: 2019-07-12 | End: 2019-07-12

## 2019-07-12 RX ORDER — POTASSIUM CHLORIDE 20 MEQ/1
20 TABLET, EXTENDED RELEASE ORAL DAILY
Status: DISCONTINUED | OUTPATIENT
Start: 2019-07-13 | End: 2019-07-13

## 2019-07-12 RX ADMIN — ENOXAPARIN SODIUM 40 MG: 100 INJECTION SUBCUTANEOUS at 18:09

## 2019-07-12 RX ADMIN — DOXYCYCLINE 100 MG: 100 TABLET, FILM COATED ORAL at 05:10

## 2019-07-12 RX ADMIN — AMPICILLIN AND SULBACTAM 3 G: 1; 2 INJECTION, POWDER, FOR SOLUTION INTRAMUSCULAR; INTRAVENOUS at 11:59

## 2019-07-12 RX ADMIN — Medication 1 CAPSULE: at 07:57

## 2019-07-12 RX ADMIN — AMLODIPINE BESYLATE 10 MG: 10 TABLET ORAL at 05:10

## 2019-07-12 RX ADMIN — SODIUM CHLORIDE 1000 ML: 9 INJECTION, SOLUTION INTRAVENOUS at 05:19

## 2019-07-12 RX ADMIN — CLOPIDOGREL BISULFATE 75 MG: 75 TABLET ORAL at 05:10

## 2019-07-12 RX ADMIN — AMPICILLIN AND SULBACTAM 3 G: 1; 2 INJECTION, POWDER, FOR SOLUTION INTRAMUSCULAR; INTRAVENOUS at 05:11

## 2019-07-12 RX ADMIN — DOXYCYCLINE 100 MG: 100 TABLET, FILM COATED ORAL at 18:09

## 2019-07-12 RX ADMIN — FUROSEMIDE 20 MG: 20 TABLET ORAL at 18:09

## 2019-07-12 RX ADMIN — AMOXICILLIN AND CLAVULANATE POTASSIUM 1 TABLET: 875; 125 TABLET, FILM COATED ORAL at 18:09

## 2019-07-12 ASSESSMENT — GAIT ASSESSMENTS
ASSISTIVE DEVICE: FRONT WHEEL WALKER
GAIT LEVEL OF ASSIST: MINIMAL ASSIST
DISTANCE (FEET): 3
DEVIATION: DECREASED BASE OF SUPPORT;DECREASED HEEL STRIKE;DECREASED TOE OFF

## 2019-07-12 ASSESSMENT — ENCOUNTER SYMPTOMS
CHILLS: 0
SPEECH CHANGE: 0
DIARRHEA: 0
VOMITING: 0
FEVER: 0
NERVOUS/ANXIOUS: 0
SINUS PAIN: 0
SHORTNESS OF BREATH: 1
SORE THROAT: 0
COUGH: 1
CONSTIPATION: 0
PND: 0
WHEEZING: 1
ABDOMINAL PAIN: 0
FOCAL WEAKNESS: 0
PALPITATIONS: 0
BLOOD IN STOOL: 0
BRUISES/BLEEDS EASILY: 0
CLAUDICATION: 0
NAUSEA: 0
SENSORY CHANGE: 0
ORTHOPNEA: 0
DEPRESSION: 0
WEAKNESS: 0
INSOMNIA: 0
HEADACHES: 0
DIZZINESS: 0
TINGLING: 0

## 2019-07-12 ASSESSMENT — COGNITIVE AND FUNCTIONAL STATUS - GENERAL
STANDING UP FROM CHAIR USING ARMS: A LITTLE
PERSONAL GROOMING: A LITTLE
MOVING TO AND FROM BED TO CHAIR: UNABLE
SUGGESTED CMS G CODE MODIFIER DAILY ACTIVITY: CK
EATING MEALS: A LITTLE
TOILETING: A LITTLE
SUGGESTED CMS G CODE MODIFIER MOBILITY: CL
MOVING FROM LYING ON BACK TO SITTING ON SIDE OF FLAT BED: UNABLE
DAILY ACTIVITIY SCORE: 16
DRESSING REGULAR LOWER BODY CLOTHING: A LOT
MOBILITY SCORE: 11
WALKING IN HOSPITAL ROOM: A LOT
CLIMB 3 TO 5 STEPS WITH RAILING: TOTAL
TURNING FROM BACK TO SIDE WHILE IN FLAT BAD: A LITTLE
HELP NEEDED FOR BATHING: A LOT
DRESSING REGULAR UPPER BODY CLOTHING: A LITTLE

## 2019-07-12 ASSESSMENT — COPD QUESTIONNAIRES
DO YOU EVER COUGH UP ANY MUCUS OR PHLEGM?: NO/ONLY WITH OCCASIONAL COLDS OR INFECTIONS
HAVE YOU SMOKED AT LEAST 100 CIGARETTES IN YOUR ENTIRE LIFE: NO/DON'T KNOW
COPD SCREENING SCORE: 2
DURING THE PAST 4 WEEKS HOW MUCH DID YOU FEEL SHORT OF BREATH: NONE/LITTLE OF THE TIME

## 2019-07-12 ASSESSMENT — LIFESTYLE VARIABLES: EVER_SMOKED: NEVER

## 2019-07-12 NOTE — PROGRESS NOTES
2 RN skin check completed with BARBRA Sanchez:  Devices in place: O2 cannula  Skin assessed under devices: yes  Confirmed pressure ulcers found: B ankle  New potential pressure ulcers noted: none  Generalized bruising; redness to LLE; jaxon B lower leg  The following interventions in place: waffle overlay; Q2 turns; barrier paste; barrier wipes; silicone nasal cannula;

## 2019-07-12 NOTE — DISCHARGE PLANNING
[facility] Name: [facility]  Spoke To: Saurabh  Outcome: Received voicemail requesting expected discharge date, blood culture orders and results, and wound culture orders and results. The requested information was faxed at 1045 to [phone] per Saurabh's request.

## 2019-07-12 NOTE — THERAPY
"Occupational Therapy Treatment completed with focus on ADLs, ADL transfers and patient education.  Functional Status:  Pt was seen for Occupational Therapy treatment today, see Therapy Kardex for details. Treatment included education in breath control with activity and at rest, self pacing techs and energy conservation for pain management. Educated pt in safety awareness techs as well. Psychosocial intervention addressed. Pt pleasant and motivated in therapy with encouragement to get up to the sink for oral hygiene and grooming. Pt demonstrated Min A for UB dressing, Min A for LB dressing using AE and extended time c/o pain in LLE. Min/Mod A for sit to stand from chair using FWW. Min A for clothing management up over hips in standing. Able to doff socks without AE but needed sock aid to don. Mod A for BSC tranfers with fatigue and pain. Pt also demonstrated  Mod A for Ambulating ADL's with FWW to sink. Oral hygiene and grooming done with Min A standing . CGA for support. Pt attempted to step backward and demo LOB,  unable to right self using FWW and Mod A to upright and balance . At risk for fall.  Mod A back to chair due to fatigue and pain in LLE. Pt required extended time for all activity. Pt was left up in chair , call light in reach, bedside table in front of her and nursing is aware. RN updated on OT treatment findings and recommendations . Pt gave good effort. Pt agrees the need to get further therapy prior to d/c home.  Continue Occupational Therapy services as per plan.    Plan of Care: Will benefit from Occupational Therapy 3 times per week  Discharge Recommendations:  Equipment Will Continue to Assess for Equipment Needs. Post-acute therapy: Recommend post-acute placement for additional Occupational Therapy services prior to discharge home. Patient can tolerate post-acute therapies at a 5x/week frequency.    See \"Rehab Therapy-Acute\" Patient Summary Report for complete documentation.   "

## 2019-07-12 NOTE — DISCHARGE PLANNING
Agency/Facility Name: [facility]  Spoke To: Saurabh  Outcome: Patient accepted. Facility can admit her anytime before 1300 on Monday. This CCA will work on transport plans.

## 2019-07-12 NOTE — PROGRESS NOTES
VSS. Pt c/o cough and ocassional SOB, but also noncompliant wearing her nasal cannula on 2L. Pt given IS, performed and encouraged to perform q hour. No c/o

## 2019-07-12 NOTE — DISCHARGE PLANNING
Anticipated Discharge Disposition: SNF    Action: LSW followed up with the Pt son stating that the Pt is not getting a kyphoplasty and that we are currently waiting to hear from the SNF if they will accept as they are reviewing records.       Barriers to Discharge: SNF acceptance    Plan: follow up on SNF referral

## 2019-07-12 NOTE — THERAPY
"Pt demonstrated impaired balance and strength due to LLE pain w/weight bearing. Pt elected to demonstrate NWB to avoid pain; pt able to hop to stand pivot to commmode and to chair. Pt w. able to tolerate seated weight shifting onto L foot. Pt would benefit from further acute PT txs to progress towards goals and independence. Recommend post acute placement.    Physical Therapy Treatment completed.   Bed Mobility:  Supine to Sit: Supervised  Transfers: Sit to Stand: (P) Minimal Assist  Gait: Level Of Assist: Minimal Assist with Front-Wheel Walker       Plan of Care: Will benefit from Physical Therapy 3 times per week    See \"Rehab Therapy-Acute\" Patient Summary Report for complete documentation.       "

## 2019-07-12 NOTE — DISCHARGE PLANNING
Anticipated Discharge Disposition: SNF    Action: LSW tiger Text Dr. Everett about Pt med clearance for SNF Dr. Everett stated that the Pt is may be getting a kyphoplasy today and if so the Pt may be cleared for tomorrow but is unsure. LSW recevied a call from the Pt son Clive and LSW gave him an update. Clive asked to speak with Pt RN. LSW called bedside RN and informed her that the Pt son would like to speak with her.     Barriers to Discharge: Med clearance, SNF acceptance    Plan: follow up with medical team and SNF

## 2019-07-13 ENCOUNTER — APPOINTMENT (OUTPATIENT)
Dept: CARDIOLOGY | Facility: MEDICAL CENTER | Age: 84
DRG: 602 | End: 2019-07-13
Attending: HOSPITALIST
Payer: MEDICARE

## 2019-07-13 LAB
ANION GAP SERPL CALC-SCNC: 9 MMOL/L (ref 0–11.9)
BACTERIA WND AEROBE CULT: NORMAL
BASOPHILS # BLD AUTO: 0.6 % (ref 0–1.8)
BASOPHILS # BLD: 0.05 K/UL (ref 0–0.12)
BUN SERPL-MCNC: 13 MG/DL (ref 8–22)
CALCIUM SERPL-MCNC: 8.6 MG/DL (ref 8.5–10.5)
CHLORIDE SERPL-SCNC: 104 MMOL/L (ref 96–112)
CO2 SERPL-SCNC: 23 MMOL/L (ref 20–33)
CREAT SERPL-MCNC: 0.68 MG/DL (ref 0.5–1.4)
EOSINOPHIL # BLD AUTO: 0.24 K/UL (ref 0–0.51)
EOSINOPHIL NFR BLD: 2.8 % (ref 0–6.9)
ERYTHROCYTE [DISTWIDTH] IN BLOOD BY AUTOMATED COUNT: 44.5 FL (ref 35.9–50)
GLUCOSE SERPL-MCNC: 97 MG/DL (ref 65–99)
GRAM STN SPEC: NORMAL
HCT VFR BLD AUTO: 30.2 % (ref 37–47)
HGB BLD-MCNC: 9.7 G/DL (ref 12–16)
IMM GRANULOCYTES # BLD AUTO: 0.15 K/UL (ref 0–0.11)
IMM GRANULOCYTES NFR BLD AUTO: 1.8 % (ref 0–0.9)
LYMPHOCYTES # BLD AUTO: 1.46 K/UL (ref 1–4.8)
LYMPHOCYTES NFR BLD: 17.3 % (ref 22–41)
MCH RBC QN AUTO: 26.1 PG (ref 27–33)
MCHC RBC AUTO-ENTMCNC: 32.1 G/DL (ref 33.6–35)
MCV RBC AUTO: 81.2 FL (ref 81.4–97.8)
MONOCYTES # BLD AUTO: 0.61 K/UL (ref 0–0.85)
MONOCYTES NFR BLD AUTO: 7.2 % (ref 0–13.4)
NEUTROPHILS # BLD AUTO: 5.94 K/UL (ref 2–7.15)
NEUTROPHILS NFR BLD: 70.3 % (ref 44–72)
NRBC # BLD AUTO: 0 K/UL
NRBC BLD-RTO: 0 /100 WBC
PLATELET # BLD AUTO: 356 K/UL (ref 164–446)
PMV BLD AUTO: 9.5 FL (ref 9–12.9)
POTASSIUM SERPL-SCNC: 3.9 MMOL/L (ref 3.6–5.5)
RBC # BLD AUTO: 3.72 M/UL (ref 4.2–5.4)
SIGNIFICANT IND 70042: NORMAL
SITE SITE: NORMAL
SODIUM SERPL-SCNC: 136 MMOL/L (ref 135–145)
SOURCE SOURCE: NORMAL
URATE SERPL-MCNC: 4.3 MG/DL (ref 1.9–8.2)
WBC # BLD AUTO: 8.5 K/UL (ref 4.8–10.8)

## 2019-07-13 PROCEDURE — 700102 HCHG RX REV CODE 250 W/ 637 OVERRIDE(OP): Performed by: INTERNAL MEDICINE

## 2019-07-13 PROCEDURE — 99232 SBSQ HOSP IP/OBS MODERATE 35: CPT | Performed by: HOSPITALIST

## 2019-07-13 PROCEDURE — 36415 COLL VENOUS BLD VENIPUNCTURE: CPT

## 2019-07-13 PROCEDURE — 700102 HCHG RX REV CODE 250 W/ 637 OVERRIDE(OP): Performed by: HOSPITALIST

## 2019-07-13 PROCEDURE — 770006 HCHG ROOM/CARE - MED/SURG/GYN SEMI*

## 2019-07-13 PROCEDURE — 93306 TTE W/DOPPLER COMPLETE: CPT

## 2019-07-13 PROCEDURE — 700111 HCHG RX REV CODE 636 W/ 250 OVERRIDE (IP): Performed by: HOSPITALIST

## 2019-07-13 PROCEDURE — A9270 NON-COVERED ITEM OR SERVICE: HCPCS | Performed by: INTERNAL MEDICINE

## 2019-07-13 PROCEDURE — 80048 BASIC METABOLIC PNL TOTAL CA: CPT

## 2019-07-13 PROCEDURE — A9270 NON-COVERED ITEM OR SERVICE: HCPCS | Performed by: HOSPITALIST

## 2019-07-13 PROCEDURE — 85025 COMPLETE CBC W/AUTO DIFF WBC: CPT

## 2019-07-13 PROCEDURE — 84550 ASSAY OF BLOOD/URIC ACID: CPT

## 2019-07-13 RX ORDER — FUROSEMIDE 10 MG/ML
20 INJECTION INTRAMUSCULAR; INTRAVENOUS
Status: DISCONTINUED | OUTPATIENT
Start: 2019-07-13 | End: 2019-07-13

## 2019-07-13 RX ADMIN — CLOPIDOGREL BISULFATE 75 MG: 75 TABLET ORAL at 05:59

## 2019-07-13 RX ADMIN — Medication 1 CAPSULE: at 11:52

## 2019-07-13 RX ADMIN — ENOXAPARIN SODIUM 40 MG: 100 INJECTION SUBCUTANEOUS at 16:56

## 2019-07-13 RX ADMIN — ACETAMINOPHEN 650 MG: 325 TABLET, FILM COATED ORAL at 23:02

## 2019-07-13 RX ADMIN — AMOXICILLIN AND CLAVULANATE POTASSIUM 1 TABLET: 875; 125 TABLET, FILM COATED ORAL at 05:59

## 2019-07-13 RX ADMIN — DOXYCYCLINE 100 MG: 100 TABLET, FILM COATED ORAL at 05:58

## 2019-07-13 RX ADMIN — AMOXICILLIN AND CLAVULANATE POTASSIUM 1 TABLET: 875; 125 TABLET, FILM COATED ORAL at 16:56

## 2019-07-13 RX ADMIN — POTASSIUM CHLORIDE 20 MEQ: 20 TABLET, EXTENDED RELEASE ORAL at 05:58

## 2019-07-13 RX ADMIN — FUROSEMIDE 20 MG: 10 INJECTION, SOLUTION INTRAMUSCULAR; INTRAVENOUS at 05:58

## 2019-07-13 RX ADMIN — DOXYCYCLINE 100 MG: 100 TABLET, FILM COATED ORAL at 16:56

## 2019-07-13 RX ADMIN — AMLODIPINE BESYLATE 10 MG: 10 TABLET ORAL at 05:59

## 2019-07-13 RX ADMIN — ACETAMINOPHEN 650 MG: 325 TABLET, FILM COATED ORAL at 16:59

## 2019-07-13 ASSESSMENT — ENCOUNTER SYMPTOMS
INSOMNIA: 0
SENSORY CHANGE: 0
WHEEZING: 0
PALPITATIONS: 0
NERVOUS/ANXIOUS: 0
CHILLS: 0
SPEECH CHANGE: 0
DIZZINESS: 0
SINUS PAIN: 0
COUGH: 0
FEVER: 0
ORTHOPNEA: 0
SHORTNESS OF BREATH: 0
BLOOD IN STOOL: 0
CLAUDICATION: 0
CONSTIPATION: 0
TINGLING: 0
NAUSEA: 0
SORE THROAT: 0
VOMITING: 0
DIARRHEA: 0
DEPRESSION: 0
ABDOMINAL PAIN: 0
PND: 0
HEADACHES: 0
BRUISES/BLEEDS EASILY: 0
FOCAL WEAKNESS: 0
WEAKNESS: 0

## 2019-07-13 NOTE — CARE PLAN
Problem: Discharge Barriers/Planning  Goal: Patient's continuum of care needs will be met    Intervention: Collaborate with Transitional Care Team and Interdisciplinary Team to meet discharge needs  SNF coordination with       Problem: Respiratory:  Goal: Respiratory status will improve    Intervention: Educate and encourage incentive spirometry usage  Encouraged use of IS, try to wean off O2

## 2019-07-13 NOTE — PROGRESS NOTES
Hospital Medicine Daily Progress Note    Date of Service  7/13/2019    Chief Complaint  Increasing lower extremity erythema, swelling, pain  Fatigue    Hospital Course   Ms. Mata is an 85-year-old female who [facility] [place] for increasing left lower extremity erythema, edema, and pain, which was accompanied by increasing fatigue.  At the [facility] hospital she was diagnosed with cellulitis and lymphangitis, and given IV clindamycin and azithromycin prior to transfer here.  She does have a past medical history of moderate peripheral arterial disease and groin stents, and is usually followed by Dr. Walton of vascular surgery at a facility in [place].  She also has a chronic ulceration in that leg and has had previous MRSA infections.  Given the above findings there was concern for early necrotizing fasciitis so orthopedic surgery and vascular were both consulted.  Dr. Hollis evaluated the patient and felt there was no current indication for vascular intervention.  He recommended continuation of aggressive IV antibiotic therapy.  Consultation by orthopedic surgery is still pending.  Patient is currently receiving IV unasyn and vancomycin.  An x-ray of the ankle showed soft tissue swelling only.  Blood cultures are in process.      Interval Problem Update  Pain and tenderness still present in the left lower extremity, though her erythema seems to be improving already.  No drainage noted from her left ankle ulcer.  Has no other complaints and had no issues overnight.    No leukocytosis on this morning's lab work.  She does have microcytic anemia which we will work-up tomorrow morning.  BMP showed a minimally low potassium level of 3.5 in addition to a GFR of 59 (improved since admit).  Lactic acid levels remain normal.  Blood cultures in process.    T-max overnight 101.1 °F, HR 50s-80s, SBP 100s-130s, O2 saturations 96-99% on 1 L/min of oxygen via nasal  cannula.    7/7 BC NGTD.  Wound culture pending collection.     7/9:  BCs negative.  Left leg markings of cellulitis improved on thigh, however remain erythematous lower leg and distal anterior thigh.  Re-ordered wound cultures since discharge seen on b/l dorsal foot ulcers.  D/w bedside RN.  C/o muscle cramping bilateral legs, started on flexeril PRN.  Mild to moderate bilateral arterial stenosis seen on arterial u/s.  Ordered u/s venous to rule out dVT.Ordered SNF since both PT/OT recommending transitional care. dc'd IVFs 75/hr since eating well.  Hg stable at 10 to 9.4.  Ordered iron studies for a.m.  7/10:  Wound culture with gram positive rods, ID and ss pending.  Improvement seen in left thigh, remains with erythema lower left leg.  Iron <10, replacement with IV dextran.   Added norco prn pain, patient states only getting tylenol prn.  dc'd oxycodone and iv dilaudid.   7/11:  Cellulitis looks remarkably improved.  WC still pending ID and SS.  Changed vancomycin to doxycycline.  Continue unasyn until cultures results.  U/s negative for DVT.  Dc isolation since no evidence for MRSA on wound culture.   7/12:  Improving cellulitis, left foot still too painful to bear weight per patient, tender to touch diffusely throughout, no ischemia noted, normal cap refill, warm to touch.  Had MESERET this a.m. With increase in O2 from 2 to 3 LPM NC.  Ordered cxr:  Pulmonary edema noted.  dc'd unasyn IV, changed to augmentin po bid and doxy to continue.  RT protocol, albuterol prn for wheeze, lasix 20mg IV daily started.  No prior home O2 use.  7/13:  Breathing better, on RA s/p lasix IV diuresis.  Echo pending.  Left leg cellulitis improving daily, less erythema noted, wound culture no growth.    Consultants/Specialty  Orthopedic surgery  Vascular surgery    Code Status  Full code    Disposition  PT/OT rec SNF.  SNF order placed.  Moved from [place] to [place], [place] to be with her son. No prior home O2.  Echo pending.    Review of  Systems  Review of Systems   Constitutional: Negative for chills, fever and malaise/fatigue.   HENT: Negative for congestion, sinus pain and sore throat.    Respiratory: Negative for cough, shortness of breath and wheezing.    Cardiovascular: Negative for chest pain, palpitations, orthopnea, claudication, leg swelling (resolved) and PND.   Gastrointestinal: Negative for abdominal pain, blood in stool, constipation, diarrhea, nausea and vomiting.   Genitourinary: Negative for dysuria, frequency and urgency.   Musculoskeletal: Positive for joint pain (left foot pain).        Left lower extremity pain   Neurological: Negative for dizziness, tingling, sensory change, speech change, focal weakness, weakness and headaches.   Endo/Heme/Allergies: Does not bruise/bleed easily.   Psychiatric/Behavioral: Negative for depression. The patient is not nervous/anxious and does not have insomnia.    All other systems reviewed and are negative.     Physical Exam  Temp:  [36.1 °C (96.9 °F)-37.4 °C (99.4 °F)] 37.4 °C (99.3 °F)  Pulse:  [68-71] 71  Resp:  [16-18] 17  BP: (133-144)/(47-55) 133/47  SpO2:  [90 %-95 %] 94 %    Physical Exam   Constitutional: She is oriented to person, place, and time. She appears well-developed and well-nourished. She is active and cooperative. She does not appear ill. No distress.   HENT:   Head: Normocephalic and atraumatic.   Eyes: Pupils are equal, round, and reactive to light. Conjunctivae are normal. Right eye exhibits no discharge. Left eye exhibits no discharge. No scleral icterus.   Neck: Normal range of motion. Neck supple. No JVD present.   Cardiovascular: Normal rate, regular rhythm, normal heart sounds and intact distal pulses.  Exam reveals no gallop and no friction rub.    No murmur heard.  Pulses:       Dorsalis pedis pulses are 1+ on the right side, and 1+ on the left side.        Posterior tibial pulses are 1+ on the right side, and 1+ on the left side.   Pulmonary/Chest: Effort normal  and breath sounds normal. No accessory muscle usage or stridor. No respiratory distress. She has no decreased breath sounds. She has no wheezes. She has no rhonchi. She has no rales.   Abdominal: Soft. Bowel sounds are normal. She exhibits no distension. There is no tenderness. There is no rebound and no guarding.   Musculoskeletal: Normal range of motion. She exhibits no edema.   Neurological: She is alert and oriented to person, place, and time. No cranial nerve deficit or sensory deficit. GCS eye subscore is 4. GCS verbal subscore is 5. GCS motor subscore is 6.   Skin: Skin is warm and dry. No rash noted. She is not diaphoretic. No erythema. No pallor.        Psychiatric: She has a normal mood and affect. Her speech is normal and behavior is normal. Judgment and thought content normal. Cognition and memory are normal.   Nursing note and vitals reviewed.    Fluids    Intake/Output Summary (Last 24 hours) at 07/13/19 1600  Last data filed at 07/13/19 1200   Gross per 24 hour   Intake              400 ml   Output             1200 ml   Net             -800 ml     Laboratory  Recent Labs      07/13/19   0435   WBC  8.5   RBC  3.72*   HEMOGLOBIN  9.7*   HEMATOCRIT  30.2*   MCV  81.2*   MCH  26.1*   MCHC  32.1*   RDW  44.5   PLATELETCT  356   MPV  9.5     Recent Labs      07/11/19   0347  07/13/19   0435   SODIUM  134*  136   POTASSIUM  4.0  3.9   CHLORIDE  108  104   CO2  17*  23   GLUCOSE  113*  97   BUN  18  13   CREATININE  0.81  0.68   CALCIUM  8.9  8.6         Imaging  DX-CHEST-PORTABLE (1 VIEW)   Final Result      Small bilateral pleural effusions with overlying atelectasis/consolidation.      Mild interstitial edema.      Stable cardiomegaly.      Atherosclerotic plaque.         US-EXTREMITY VENOUS LOWER BILAT   Final Result      US-EXTREMITY ARTERY LOWER BILAT   Final Result      US-MARY SINGLE LEVEL BILAT   Final Result      US-EXTREMITY ARTERY LOWER BILAT W/MARY (COMBO)   Final Result      DX-ANKLE 3+ VIEWS  LEFT   Final Result      No definite acute osseous abnormality but the evaluation limited due to osseous demineralization.      Diffuse soft tissue swelling.      DX-CHEST-PORTABLE (1 VIEW)   Final Result         1. No acute cardiopulmonary abnormalities are identified.      EC-ECHOCARDIOGRAM COMPLETE W/O CONT    (Results Pending)      Assessment/Plan  * Cellulitis of left lower extremity- (present on admission)   Assessment & Plan    No leukocytosis, lactic acid remains normal.  No current concern for necrotizing fasciitis.  X-ray of the left ankle showed soft tissue swelling only.  Pulses 1+ at best.  Continue pain control.  Blood cultures negative x2  7/9 wound culture of LLE ulceration no growth.  vascular surgery has been consulted.  U/s venous negative.  Mild to moderate arterial stenosis seen on arterial u/s.  7/12 Improved on IV abx, transitioned to oral.     Chronic ulcer of bilateral ankles due to PVD ([facility])- (present on admission)   Assessment & Plan    Wound care ordered.  Mild to moderate arterial stenosis seen on arterial u/s  plavix  Heparin tid  Venous us negative.     Acute pulmonary edema ([facility])   Assessment & Plan    2/2 IV abx for several days.  Changed to po 7/12.  CXR with edema noted.  Resolved SOB/wheeze with Lasix 20mg IV daily, dc'd lasix.  RT protocol  Albuterol neb prn since wheeze appreciated.  Echo ordered.       Iron deficiency anemia- (present on admission)   Assessment & Plan    Iron <10, ferritin 150.  Ordered IV dextran replacement.  No obvious blood loss noted.  Stable Hg at 9.  Changed heparin to lovenox daily for patient comfort.     PAD (peripheral artery disease) ([facility])- (present on admission)   Assessment & Plan    Has moderate PAD.  Evaluated by Dr. Heaton of vascular surgery who was recommended against any type of surgical intervention.  Wound care consult for chronic ulcers.  plavix daily.     Essential hypertension- (present on admission)   Assessment & Plan     Well-controlled on current regimen.  SBP 100s-130s.  Continue home amlodipine.        VTE prophylaxis:  lovenox

## 2019-07-13 NOTE — CARE PLAN
Problem: Communication  Goal: The ability to communicate needs accurately and effectively will improve    Intervention: Reorient patient to environment as needed   07/13/19 0012   OTHER   Oriented to: All of the Following : Location of Bathroom, Visiting Policy, Unit Routine, Call Light and Bedside Controls, Bedside Rail Policy, Smoking Policy, Rights and Responsibilities, Bedside Report, and Patient Education Notebook   Patient AOx4. Hourly rounding in place.      Problem: Bowel/Gastric:  Goal: Normal bowel function is maintained or improved   07/12/19 0808   OTHER   Last BM 07/11/19   No complaints of abdominal pain or n/v.

## 2019-07-13 NOTE — ASSESSMENT & PLAN NOTE
2/2 IV abx for several days.  Changed to po 7/12.  CXR with edema noted.  SOB/wheeze improving with Lasix 20mg IV daily  RT protocol  Albuterol neb prn since wheeze appreciated.  Echo wnl

## 2019-07-13 NOTE — PROGRESS NOTES
Received in bed, aaox4, c/o urinary frequency, diuresing, encouraged to use IS and ambulation, POC discussed, ECHO ordered, needs attended.

## 2019-07-13 NOTE — PROGRESS NOTES
Hospital Medicine Daily Progress Note    Date of Service  7/12/2019    Chief Complaint  Increasing lower extremity erythema, swelling, pain  Fatigue    Hospital Course   Ms. Mata is an 85-year-old female [facility] [place] for increasing left lower extremity erythema, edema, and pain, which was accompanied by increasing fatigue.  At the [facility] hospital she was diagnosed with cellulitis and lymphangitis, and given IV clindamycin and azithromycin prior to transfer here.  She does have a past medical history of moderate peripheral arterial disease and groin stents, and is usually followed by Dr. Walton of vascular surgery at a facility in [place].  She also has a chronic ulceration in that leg and has had previous MRSA infections.  Given the above findings there was concern for early necrotizing fasciitis so orthopedic surgery and vascular were both consulted.  Dr. Hollis evaluated the patient and felt there was no current indication for vascular intervention.  He recommended continuation of aggressive IV antibiotic therapy.  Consultation by orthopedic surgery is still pending.  Patient is currently receiving IV unasyn and vancomycin.  An x-ray of the ankle showed soft tissue swelling only.  Blood cultures are in process.      Interval Problem Update  Pain and tenderness still present in the left lower extremity, though her erythema seems to be improving already.  No drainage noted from her left ankle ulcer.  Has no other complaints and had no issues overnight.    No leukocytosis on this morning's lab work.  She does have microcytic anemia which we will work-up tomorrow morning.  BMP showed a minimally low potassium level of 3.5 in addition to a GFR of 59 (improved since admit).  Lactic acid levels remain normal.  Blood cultures in process.    T-max overnight 101.1 °F, HR 50s-80s, SBP 100s-130s, O2 saturations 96-99% on 1 L/min of oxygen via nasal  cannula.    7/7 BC NGTD.  Wound culture pending collection.     7/9:  BCs negative.  Left leg markings of cellulitis improved on thigh, however remain erythematous lower leg and distal anterior thigh.  Re-ordered wound cultures since discharge seen on b/l dorsal foot ulcers.  D/w bedside RN.  C/o muscle cramping bilateral legs, started on flexeril PRN.  Mild to moderate bilateral arterial stenosis seen on arterial u/s.  Ordered u/s venous to rule out dVT.Ordered SNF since both PT/OT recommending transitional care. dc'd IVFs 75/hr since eating well.  Hg stable at 10 to 9.4.  Ordered iron studies for a.m.  7/10:  Wound culture with gram positive rods, ID and ss pending.  Improvement seen in left thigh, remains with erythema lower left leg.  Iron <10, replacement with IV dextran.   Added norco prn pain, patient states only getting tylenol prn.  dc'd oxycodone and iv dilaudid.   7/11:  Cellulitis looks remarkably improved.  WC still pending ID and SS.  Changed vancomycin to doxycycline.  Continue unasyn until cultures results.  U/s negative for DVT.  Dc isolation since no evidence for MRSA on wound culture.   7/12:  Improving cellulitis, left foot still too painful to bear weight per patient, tender to touch diffusely throughout, no ischemia noted, normal cap refill, warm to touch.  Had MESERET this a.m. With increase in O2 from 2 to 3 LPM NC.  Ordered cxr:  Pulmonary edema noted.  dc'd unasyn IV, changed to augmentin po bid and doxy to continue.  RT protocol, albuterol prn for wheeze, lasix 20mg IV daily started.  No prior home O2 use.    Consultants/Specialty  Orthopedic surgery  Vascular surgery    Code Status  Full code    Disposition  PT/OT rec SNF.  SNF order placed.  Moving from [place] to [place], [place] to be with her son. No prior home O2.     Review of Systems  Review of Systems   Constitutional: Negative for chills, fever and malaise/fatigue.   HENT: Negative for congestion, sinus pain and sore throat.     Respiratory: Positive for cough, shortness of breath and wheezing.    Cardiovascular: Positive for leg swelling (Left lower extremity, right foot). Negative for chest pain, palpitations, orthopnea, claudication and PND.   Gastrointestinal: Negative for abdominal pain, blood in stool, constipation, diarrhea, nausea and vomiting.   Genitourinary: Negative for dysuria, frequency and urgency.   Musculoskeletal:        Left lower extremity pain and tenderness.   Neurological: Negative for dizziness, tingling, sensory change, speech change, focal weakness, weakness and headaches.   Endo/Heme/Allergies: Does not bruise/bleed easily.   Psychiatric/Behavioral: Negative for depression. The patient is not nervous/anxious and does not have insomnia.    All other systems reviewed and are negative.     Physical Exam  Temp:  [36.8 °C (98.2 °F)-37.9 °C (100.3 °F)] 37.4 °C (99.4 °F)  Pulse:  [70-74] 71  Resp:  [15-18] 18  BP: (121-150)/(48-59) 144/55  SpO2:  [90 %-100 %] 95 %    Physical Exam   Constitutional: She is oriented to person, place, and time. She appears well-developed and well-nourished. She is active and cooperative. She appears ill. No distress.   HENT:   Head: Normocephalic and atraumatic.   Eyes: Pupils are equal, round, and reactive to light. Conjunctivae are normal. Right eye exhibits no discharge. Left eye exhibits no discharge. No scleral icterus.   Neck: Normal range of motion. Neck supple. No JVD present.   Cardiovascular: Normal rate, regular rhythm, normal heart sounds and intact distal pulses.  Exam reveals no gallop and no friction rub.    No murmur heard.  Pulses:       Dorsalis pedis pulses are 1+ on the right side, and 1+ on the left side.        Posterior tibial pulses are 1+ on the right side, and 1+ on the left side.   Pulmonary/Chest: Effort normal. No accessory muscle usage or stridor. No respiratory distress. She has no decreased breath sounds. She has wheezes. She has no rhonchi. She has rales.    Abdominal: Soft. She exhibits no distension. Bowel sounds are decreased. There is no tenderness. There is no rebound and no guarding.   Musculoskeletal: Normal range of motion. She exhibits no edema.   Neurological: She is alert and oriented to person, place, and time. No cranial nerve deficit or sensory deficit. GCS eye subscore is 4. GCS verbal subscore is 5. GCS motor subscore is 6.   Skin: Skin is warm and dry. No rash noted. She is not diaphoretic. There is erythema. No pallor.        Psychiatric: She has a normal mood and affect. Her speech is normal and behavior is normal. Judgment and thought content normal. Cognition and memory are normal.   Nursing note and vitals reviewed.    Fluids    Intake/Output Summary (Last 24 hours) at 07/12/19 1814  Last data filed at 07/12/19 0510   Gross per 24 hour   Intake              300 ml   Output                0 ml   Net              300 ml     Laboratory  Recent Labs      07/10/19   0438   WBC  5.5   RBC  3.64*   HEMOGLOBIN  9.2*   HEMATOCRIT  30.1*   MCV  82.7   MCH  25.3*   MCHC  30.6*   RDW  45.3   PLATELETCT  216   MPV  10.7     Recent Labs      07/10/19   0438  07/11/19   0347   SODIUM  135  134*   POTASSIUM  3.8  4.0   CHLORIDE  107  108   CO2  21  17*   GLUCOSE  98  113*   BUN  17  18   CREATININE  0.87  0.81   CALCIUM  8.7  8.9         Imaging  DX-CHEST-PORTABLE (1 VIEW)   Final Result      Small bilateral pleural effusions with overlying atelectasis/consolidation.      Mild interstitial edema.      Stable cardiomegaly.      Atherosclerotic plaque.         US-EXTREMITY VENOUS LOWER BILAT   Final Result      US-EXTREMITY ARTERY LOWER BILAT   Final Result      US-MARY SINGLE LEVEL BILAT   Final Result      US-EXTREMITY ARTERY LOWER BILAT W/MARY (COMBO)   Final Result      DX-ANKLE 3+ VIEWS LEFT   Final Result      No definite acute osseous abnormality but the evaluation limited due to osseous demineralization.      Diffuse soft tissue swelling.       DX-CHEST-PORTABLE (1 VIEW)   Final Result         1. No acute cardiopulmonary abnormalities are identified.      EC-ECHOCARDIOGRAM COMPLETE W/O CONT    (Results Pending)      Assessment/Plan  * Cellulitis of left lower extremity- (present on admission)   Assessment & Plan    No leukocytosis, lactic acid remains normal.  No current concern for necrotizing fasciitis.  X-ray of the left ankle showed soft tissue swelling only.  Pulses 1+ at best.  Continue pain control.  Blood cultures negative x2  7/9 wound culture of LLE ulceration no growth.  vascular surgery has been consulted.  U/s venous negative.  Mild to moderate arterial stenosis seen on arterial u/s.  7/12 Improved on IV abx, transitioned to oral.     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  Mild to moderate arterial stenosis seen on arterial u/s  plavix  Heparin tid  Venous us negative.     Acute pulmonary edema (HCC)   Assessment & Plan    2/2 IV abx for several days.  Changed to po 7/12.  CXR with edema noted.  Lasix 20mg IV daily  RT protocol  Albuterol neb prn since wheeze appreciated.  Echo ordered.       Iron deficiency anemia- (present on admission)   Assessment & Plan    Iron <10, ferritin 150.  Ordered IV dextran replacement.  No obvious blood loss noted.  Stable Hg at 9.  Changed heparin to lovenox daily for patient comfort.     PAD (peripheral artery disease) (HCC)- (present on admission)   Assessment & Plan    Has moderate PAD.  Evaluated by Dr. Heaton of vascular surgery who was recommended against any type of surgical intervention.  Wound care consult for chronic ulcers.  plavix daily.     Essential hypertension- (present on admission)   Assessment & Plan    Well-controlled on current regimen.  SBP 100s-130s.  Continue home amlodipine.        VTE prophylaxis:  lovenox

## 2019-07-14 LAB
ANION GAP SERPL CALC-SCNC: 6 MMOL/L (ref 0–11.9)
BASOPHILS # BLD AUTO: 0.6 % (ref 0–1.8)
BASOPHILS # BLD: 0.05 K/UL (ref 0–0.12)
BUN SERPL-MCNC: 13 MG/DL (ref 8–22)
CALCIUM SERPL-MCNC: 8.8 MG/DL (ref 8.5–10.5)
CHLORIDE SERPL-SCNC: 101 MMOL/L (ref 96–112)
CO2 SERPL-SCNC: 25 MMOL/L (ref 20–33)
CREAT SERPL-MCNC: 0.68 MG/DL (ref 0.5–1.4)
EOSINOPHIL # BLD AUTO: 0.24 K/UL (ref 0–0.51)
EOSINOPHIL NFR BLD: 2.8 % (ref 0–6.9)
ERYTHROCYTE [DISTWIDTH] IN BLOOD BY AUTOMATED COUNT: 45.5 FL (ref 35.9–50)
GLUCOSE SERPL-MCNC: 83 MG/DL (ref 65–99)
HCT VFR BLD AUTO: 31 % (ref 37–47)
HGB BLD-MCNC: 9.9 G/DL (ref 12–16)
IMM GRANULOCYTES # BLD AUTO: 0.14 K/UL (ref 0–0.11)
IMM GRANULOCYTES NFR BLD AUTO: 1.7 % (ref 0–0.9)
LV EJECT FRACT  99904: 65
LV EJECT FRACT MOD 2C 99903: 71.98
LV EJECT FRACT MOD 4C 99902: 65.49
LV EJECT FRACT MOD BP 99901: 69.13
LYMPHOCYTES # BLD AUTO: 1.46 K/UL (ref 1–4.8)
LYMPHOCYTES NFR BLD: 17.2 % (ref 22–41)
MCH RBC QN AUTO: 26.2 PG (ref 27–33)
MCHC RBC AUTO-ENTMCNC: 31.9 G/DL (ref 33.6–35)
MCV RBC AUTO: 82 FL (ref 81.4–97.8)
MONOCYTES # BLD AUTO: 0.7 K/UL (ref 0–0.85)
MONOCYTES NFR BLD AUTO: 8.3 % (ref 0–13.4)
NEUTROPHILS # BLD AUTO: 5.89 K/UL (ref 2–7.15)
NEUTROPHILS NFR BLD: 69.4 % (ref 44–72)
NRBC # BLD AUTO: 0 K/UL
NRBC BLD-RTO: 0 /100 WBC
PLATELET # BLD AUTO: 413 K/UL (ref 164–446)
PMV BLD AUTO: 9.5 FL (ref 9–12.9)
POTASSIUM SERPL-SCNC: 4 MMOL/L (ref 3.6–5.5)
RBC # BLD AUTO: 3.78 M/UL (ref 4.2–5.4)
SODIUM SERPL-SCNC: 132 MMOL/L (ref 135–145)
WBC # BLD AUTO: 8.5 K/UL (ref 4.8–10.8)

## 2019-07-14 PROCEDURE — 99232 SBSQ HOSP IP/OBS MODERATE 35: CPT | Performed by: HOSPITALIST

## 2019-07-14 PROCEDURE — 700111 HCHG RX REV CODE 636 W/ 250 OVERRIDE (IP): Performed by: HOSPITALIST

## 2019-07-14 PROCEDURE — 302146: Performed by: INTERNAL MEDICINE

## 2019-07-14 PROCEDURE — 80048 BASIC METABOLIC PNL TOTAL CA: CPT

## 2019-07-14 PROCEDURE — A9270 NON-COVERED ITEM OR SERVICE: HCPCS | Performed by: INTERNAL MEDICINE

## 2019-07-14 PROCEDURE — 770006 HCHG ROOM/CARE - MED/SURG/GYN SEMI*

## 2019-07-14 PROCEDURE — A9270 NON-COVERED ITEM OR SERVICE: HCPCS | Performed by: HOSPITALIST

## 2019-07-14 PROCEDURE — 700102 HCHG RX REV CODE 250 W/ 637 OVERRIDE(OP): Performed by: INTERNAL MEDICINE

## 2019-07-14 PROCEDURE — 93306 TTE W/DOPPLER COMPLETE: CPT | Mod: 26 | Performed by: INTERNAL MEDICINE

## 2019-07-14 PROCEDURE — 36415 COLL VENOUS BLD VENIPUNCTURE: CPT

## 2019-07-14 PROCEDURE — 302146: Performed by: HOSPITALIST

## 2019-07-14 PROCEDURE — 700102 HCHG RX REV CODE 250 W/ 637 OVERRIDE(OP): Performed by: HOSPITALIST

## 2019-07-14 PROCEDURE — 85025 COMPLETE CBC W/AUTO DIFF WBC: CPT

## 2019-07-14 RX ORDER — FUROSEMIDE 10 MG/ML
20 INJECTION INTRAMUSCULAR; INTRAVENOUS
Status: DISCONTINUED | OUTPATIENT
Start: 2019-07-14 | End: 2019-07-15

## 2019-07-14 RX ORDER — POTASSIUM CHLORIDE 20 MEQ/1
20 TABLET, EXTENDED RELEASE ORAL DAILY
Status: DISCONTINUED | OUTPATIENT
Start: 2019-07-14 | End: 2019-07-15

## 2019-07-14 RX ADMIN — AMOXICILLIN AND CLAVULANATE POTASSIUM 1 TABLET: 875; 125 TABLET, FILM COATED ORAL at 06:42

## 2019-07-14 RX ADMIN — DOXYCYCLINE 100 MG: 100 TABLET, FILM COATED ORAL at 16:51

## 2019-07-14 RX ADMIN — DOXYCYCLINE 100 MG: 100 TABLET, FILM COATED ORAL at 06:42

## 2019-07-14 RX ADMIN — ACETAMINOPHEN 650 MG: 325 TABLET, FILM COATED ORAL at 21:11

## 2019-07-14 RX ADMIN — FUROSEMIDE 20 MG: 10 INJECTION, SOLUTION INTRAMUSCULAR; INTRAVENOUS at 16:30

## 2019-07-14 RX ADMIN — AMLODIPINE BESYLATE 10 MG: 10 TABLET ORAL at 06:42

## 2019-07-14 RX ADMIN — Medication 1 CAPSULE: at 06:42

## 2019-07-14 RX ADMIN — AMOXICILLIN AND CLAVULANATE POTASSIUM 1 TABLET: 875; 125 TABLET, FILM COATED ORAL at 16:51

## 2019-07-14 RX ADMIN — CLOPIDOGREL BISULFATE 75 MG: 75 TABLET ORAL at 06:42

## 2019-07-14 RX ADMIN — ENOXAPARIN SODIUM 40 MG: 100 INJECTION SUBCUTANEOUS at 16:51

## 2019-07-14 RX ADMIN — POTASSIUM CHLORIDE 20 MEQ: 1500 TABLET, EXTENDED RELEASE ORAL at 16:52

## 2019-07-14 ASSESSMENT — ENCOUNTER SYMPTOMS
FOCAL WEAKNESS: 0
NAUSEA: 0
SENSORY CHANGE: 0
COUGH: 0
HEADACHES: 0
WEAKNESS: 0
WHEEZING: 0
SHORTNESS OF BREATH: 0
DIARRHEA: 0
CHILLS: 0
BLOOD IN STOOL: 0
DIZZINESS: 0
ORTHOPNEA: 0
PALPITATIONS: 0
VOMITING: 0
NERVOUS/ANXIOUS: 0
SINUS PAIN: 0
BRUISES/BLEEDS EASILY: 0
ABDOMINAL PAIN: 0
SPEECH CHANGE: 0
TINGLING: 0
PND: 0
SORE THROAT: 0
INSOMNIA: 0
FEVER: 0
CONSTIPATION: 0
CLAUDICATION: 0
DEPRESSION: 0

## 2019-07-14 NOTE — CARE PLAN
Problem: Pain Management  Goal: Pain level will decrease to patient's comfort goal  Patient medicated with Tylenol for pain to left foot rated at 4 to 5/10.  Patient able to rest comfortably.

## 2019-07-14 NOTE — PROGRESS NOTES
Hospital Medicine Daily Progress Note    Date of Service  7/14/2019    Chief Complaint  Increasing lower extremity erythema, swelling, pain  Fatigue    Hospital Course   Ms. Mata is an 85-year-old female [facility] [place] for increasing left lower extremity erythema, edema, and pain, which was accompanied by increasing fatigue.  At the [facility] hospital she was diagnosed with cellulitis and lymphangitis, and given IV clindamycin and azithromycin prior to transfer here.  She does have a past medical history of moderate peripheral arterial disease and groin stents, and is usually followed by Dr. Walton of vascular surgery at a facility in [place].  She also has a chronic ulceration in that leg and has had previous MRSA infections.  Given the above findings there was concern for early necrotizing fasciitis so orthopedic surgery and vascular were both consulted.  Dr. Hollis evaluated the patient and felt there was no current indication for vascular intervention.  He recommended continuation of aggressive IV antibiotic therapy.  Consultation by orthopedic surgery is still pending.  Patient is currently receiving IV unasyn and vancomycin.  An x-ray of the ankle showed soft tissue swelling only.  Blood cultures are in process.      Interval Problem Update  Pain and tenderness still present in the left lower extremity, though her erythema seems to be improving already.  No drainage noted from her left ankle ulcer.  Has no other complaints and had no issues overnight.    No leukocytosis on this morning's lab work.  She does have microcytic anemia which we will work-up tomorrow morning.  BMP showed a minimally low potassium level of 3.5 in addition to a GFR of 59 (improved since admit).  Lactic acid levels remain normal.  Blood cultures in process.    T-max overnight 101.1 °F, HR 50s-80s, SBP 100s-130s, O2 saturations 96-99% on 1 L/min of oxygen via nasal  cannula.    7/7 BC NGTD.  Wound culture pending collection.     7/9:  BCs negative.  Left leg markings of cellulitis improved on thigh, however remain erythematous lower leg and distal anterior thigh.  Re-ordered wound cultures since discharge seen on b/l dorsal foot ulcers.  D/w bedside RN.  C/o muscle cramping bilateral legs, started on flexeril PRN.  Mild to moderate bilateral arterial stenosis seen on arterial u/s.  Ordered u/s venous to rule out dVT.Ordered SNF since both PT/OT recommending transitional care. dc'd IVFs 75/hr since eating well.  Hg stable at 10 to 9.4.  Ordered iron studies for a.m.  7/10:  Wound culture with gram positive rods, ID and ss pending.  Improvement seen in left thigh, remains with erythema lower left leg.  Iron <10, replacement with IV dextran.   Added norco prn pain, patient states only getting tylenol prn.  dc'd oxycodone and iv dilaudid.   7/11:  Cellulitis looks remarkably improved.  WC still pending ID and SS.  Changed vancomycin to doxycycline.  Continue unasyn until cultures results.  U/s negative for DVT.  Dc isolation since no evidence for MRSA on wound culture.   7/12:  Improving cellulitis, left foot still too painful to bear weight per patient, tender to touch diffusely throughout, no ischemia noted, normal cap refill, warm to touch.  Had MESERET this a.m. With increase in O2 from 2 to 3 LPM NC.  Ordered cxr:  Pulmonary edema noted.  dc'd unasyn IV, changed to augmentin po bid and doxy to continue.  RT protocol, albuterol prn for wheeze, lasix 20mg IV daily started.  No prior home O2 use.  7/13:  Breathing better, on RA s/p lasix IV diuresis.  Echo pending.  Left leg cellulitis improving daily, less erythema noted, wound culture no growth.  7/14:  Swollen left foot noted on exam, while asleep drop in O2 to 84%, but back to 90% when awake.  Echo wnl.  Ordered lasix 20mg IV daily.  No wheeze.    Consultants/Specialty  Orthopedic surgery  Vascular surgery    Code Status  Full  code    Disposition  PT/OT rec SNF.  SNF order placed.  Moved from [place] to [place], [place] to be with her son. No prior home O2.  Plan for transfer to SNF 7/15.    Review of Systems  Review of Systems   Constitutional: Negative for chills, fever and malaise/fatigue.   HENT: Negative for congestion, sinus pain and sore throat.    Respiratory: Negative for cough, shortness of breath and wheezing.    Cardiovascular: Negative for chest pain, palpitations, orthopnea, claudication, leg swelling (resolved) and PND.   Gastrointestinal: Negative for abdominal pain, blood in stool, constipation, diarrhea, nausea and vomiting.   Genitourinary: Negative for dysuria, frequency and urgency.   Musculoskeletal: Positive for joint pain (left foot pain).        Left lower extremity pain   Neurological: Negative for dizziness, tingling, sensory change, speech change, focal weakness, weakness and headaches.   Endo/Heme/Allergies: Does not bruise/bleed easily.   Psychiatric/Behavioral: Negative for depression. The patient is not nervous/anxious and does not have insomnia.    All other systems reviewed and are negative.     Physical Exam  Temp:  [37.1 °C (98.8 °F)-37.3 °C (99.2 °F)] 37.2 °C (98.9 °F)  Pulse:  [66-77] 66  Resp:  [17] 17  BP: (123-149)/(48-60) 133/51  SpO2:  [87 %-98 %] 92 %    Physical Exam   Constitutional: She is oriented to person, place, and time. She appears well-developed and well-nourished. She is active and cooperative. She does not appear ill. No distress.   HENT:   Head: Normocephalic and atraumatic.   Eyes: Pupils are equal, round, and reactive to light. Conjunctivae are normal. Right eye exhibits no discharge. Left eye exhibits no discharge. No scleral icterus.   Neck: Normal range of motion. Neck supple. No JVD present.   Cardiovascular: Normal rate, regular rhythm, normal heart sounds and intact distal pulses.  Exam reveals no gallop and no friction rub.    No murmur heard.  Pulses:       Dorsalis pedis  pulses are 1+ on the right side, and 1+ on the left side.        Posterior tibial pulses are 1+ on the right side, and 1+ on the left side.   Pulmonary/Chest: Effort normal and breath sounds normal. No accessory muscle usage or stridor. No respiratory distress. She has no decreased breath sounds. She has no wheezes. She has no rhonchi. She has no rales.   Abdominal: Soft. Bowel sounds are normal. She exhibits no distension. There is no tenderness. There is no rebound and no guarding.   Musculoskeletal: Normal range of motion. She exhibits edema (left foot).   Neurological: She is alert and oriented to person, place, and time. No cranial nerve deficit or sensory deficit. GCS eye subscore is 4. GCS verbal subscore is 5. GCS motor subscore is 6.   Skin: Skin is warm and dry. No rash noted. She is not diaphoretic. No erythema. No pallor.        Psychiatric: She has a normal mood and affect. Her speech is normal and behavior is normal. Judgment and thought content normal. Cognition and memory are normal.   Nursing note and vitals reviewed.    Fluids    Intake/Output Summary (Last 24 hours) at 07/14/19 1616  Last data filed at 07/13/19 2300   Gross per 24 hour   Intake              200 ml   Output                0 ml   Net              200 ml     Laboratory  Recent Labs      07/13/19 0435 07/14/19 0437   WBC  8.5  8.5   RBC  3.72*  3.78*   HEMOGLOBIN  9.7*  9.9*   HEMATOCRIT  30.2*  31.0*   MCV  81.2*  82.0   MCH  26.1*  26.2*   MCHC  32.1*  31.9*   RDW  44.5  45.5   PLATELETCT  356  413   MPV  9.5  9.5     Recent Labs      07/13/19 0435 07/14/19 0437   SODIUM  136  132*   POTASSIUM  3.9  4.0   CHLORIDE  104  101   CO2  23  25   GLUCOSE  97  83   BUN  13  13   CREATININE  0.68  0.68   CALCIUM  8.6  8.8         Imaging  EC-ECHOCARDIOGRAM COMPLETE W/O CONT   Final Result      DX-CHEST-PORTABLE (1 VIEW)   Final Result      Small bilateral pleural effusions with overlying atelectasis/consolidation.      Mild  interstitial edema.      Stable cardiomegaly.      Atherosclerotic plaque.         US-EXTREMITY VENOUS LOWER BILAT   Final Result      US-EXTREMITY ARTERY LOWER BILAT   Final Result      US-MARY SINGLE LEVEL BILAT   Final Result      US-EXTREMITY ARTERY LOWER BILAT W/MARY (COMBO)   Final Result      DX-ANKLE 3+ VIEWS LEFT   Final Result      No definite acute osseous abnormality but the evaluation limited due to osseous demineralization.      Diffuse soft tissue swelling.      DX-CHEST-PORTABLE (1 VIEW)   Final Result         1. No acute cardiopulmonary abnormalities are identified.         Assessment/Plan  * Cellulitis of left lower extremity- (present on admission)   Assessment & Plan    No leukocytosis, lactic acid remains normal.  No current concern for necrotizing fasciitis.  X-ray of the left ankle showed soft tissue swelling only.  Pulses 1+ at best.  Continue pain control.  Blood cultures negative x2  7/9 wound culture of LLE ulceration no growth.  vascular surgery has been consulted.  U/s venous negative.  Mild to moderate arterial stenosis seen on arterial u/s.  7/12 Improved on IV abx, transitioned to oral.     Chronic ulcer of bilateral ankles due to PVD ([facility])- (present on admission)   Assessment & Plan    Wound care ordered.  Mild to moderate arterial stenosis seen on arterial u/s  plavix  Heparin tid  Venous us negative.     Acute pulmonary edema (HCC)   Assessment & Plan    2/2 IV abx for several days.  Changed to po 7/12.  CXR with edema noted.  SOB/wheeze improving with Lasix 20mg IV daily  RT protocol  Albuterol neb prn since wheeze appreciated.  Echo wnl       Iron deficiency anemia- (present on admission)   Assessment & Plan    Iron <10, ferritin 150.  Ordered IV dextran replacement.  No obvious blood loss noted.  Stable Hg at 9.  Changed heparin to lovenox daily for patient comfort.     PAD (peripheral artery disease) ([facility])- (present on admission)   Assessment & Plan    Has moderate  PAD.  Evaluated by Dr. Heaton of vascular surgery who was recommended against any type of surgical intervention.  Wound care consult for chronic ulcers.  plavix daily.     Essential hypertension- (present on admission)   Assessment & Plan    Well-controlled on current regimen.  SBP 100s-130s.  Continue home amlodipine.        VTE prophylaxis:  lovenox

## 2019-07-15 PROBLEM — E87.6 HYPOKALEMIA: Status: ACTIVE | Noted: 2019-07-15

## 2019-07-15 LAB
ANION GAP SERPL CALC-SCNC: 6 MMOL/L (ref 0–11.9)
BASOPHILS # BLD AUTO: 0.7 % (ref 0–1.8)
BASOPHILS # BLD: 0.05 K/UL (ref 0–0.12)
BUN SERPL-MCNC: 10 MG/DL (ref 8–22)
CALCIUM SERPL-MCNC: 8.8 MG/DL (ref 8.5–10.5)
CHLORIDE SERPL-SCNC: 101 MMOL/L (ref 96–112)
CO2 SERPL-SCNC: 25 MMOL/L (ref 20–33)
CREAT SERPL-MCNC: 0.72 MG/DL (ref 0.5–1.4)
EOSINOPHIL # BLD AUTO: 0.23 K/UL (ref 0–0.51)
EOSINOPHIL NFR BLD: 3 % (ref 0–6.9)
ERYTHROCYTE [DISTWIDTH] IN BLOOD BY AUTOMATED COUNT: 43.9 FL (ref 35.9–50)
GLUCOSE SERPL-MCNC: 94 MG/DL (ref 65–99)
HCT VFR BLD AUTO: 32.1 % (ref 37–47)
HGB BLD-MCNC: 10.4 G/DL (ref 12–16)
IMM GRANULOCYTES # BLD AUTO: 0.1 K/UL (ref 0–0.11)
IMM GRANULOCYTES NFR BLD AUTO: 1.3 % (ref 0–0.9)
LYMPHOCYTES # BLD AUTO: 1.42 K/UL (ref 1–4.8)
LYMPHOCYTES NFR BLD: 18.8 % (ref 22–41)
MCH RBC QN AUTO: 26.3 PG (ref 27–33)
MCHC RBC AUTO-ENTMCNC: 32.4 G/DL (ref 33.6–35)
MCV RBC AUTO: 81.3 FL (ref 81.4–97.8)
MONOCYTES # BLD AUTO: 0.71 K/UL (ref 0–0.85)
MONOCYTES NFR BLD AUTO: 9.4 % (ref 0–13.4)
NEUTROPHILS # BLD AUTO: 5.06 K/UL (ref 2–7.15)
NEUTROPHILS NFR BLD: 66.8 % (ref 44–72)
NRBC # BLD AUTO: 0 K/UL
NRBC BLD-RTO: 0 /100 WBC
PLATELET # BLD AUTO: 485 K/UL (ref 164–446)
PMV BLD AUTO: 9.4 FL (ref 9–12.9)
POTASSIUM SERPL-SCNC: 3.5 MMOL/L (ref 3.6–5.5)
RBC # BLD AUTO: 3.95 M/UL (ref 4.2–5.4)
SODIUM SERPL-SCNC: 132 MMOL/L (ref 135–145)
WBC # BLD AUTO: 7.6 K/UL (ref 4.8–10.8)

## 2019-07-15 PROCEDURE — 700102 HCHG RX REV CODE 250 W/ 637 OVERRIDE(OP): Performed by: INTERNAL MEDICINE

## 2019-07-15 PROCEDURE — 85025 COMPLETE CBC W/AUTO DIFF WBC: CPT

## 2019-07-15 PROCEDURE — 80048 BASIC METABOLIC PNL TOTAL CA: CPT

## 2019-07-15 PROCEDURE — 700111 HCHG RX REV CODE 636 W/ 250 OVERRIDE (IP): Performed by: HOSPITALIST

## 2019-07-15 PROCEDURE — 700102 HCHG RX REV CODE 250 W/ 637 OVERRIDE(OP): Performed by: HOSPITALIST

## 2019-07-15 PROCEDURE — 99239 HOSP IP/OBS DSCHRG MGMT >30: CPT | Performed by: HOSPITALIST

## 2019-07-15 PROCEDURE — 36415 COLL VENOUS BLD VENIPUNCTURE: CPT

## 2019-07-15 PROCEDURE — A9270 NON-COVERED ITEM OR SERVICE: HCPCS | Performed by: INTERNAL MEDICINE

## 2019-07-15 PROCEDURE — 770006 HCHG ROOM/CARE - MED/SURG/GYN SEMI*

## 2019-07-15 PROCEDURE — A9270 NON-COVERED ITEM OR SERVICE: HCPCS | Performed by: HOSPITALIST

## 2019-07-15 RX ORDER — AMOXICILLIN AND CLAVULANATE POTASSIUM 875; 125 MG/1; MG/1
1 TABLET, FILM COATED ORAL EVERY 12 HOURS
Qty: 4 TAB | Refills: 0 | Status: SHIPPED | OUTPATIENT
Start: 2019-07-15 | End: 2019-07-17

## 2019-07-15 RX ORDER — ACETAMINOPHEN 325 MG/1
650 TABLET ORAL EVERY 6 HOURS PRN
Qty: 30 TAB | Refills: 0
Start: 2019-07-15

## 2019-07-15 RX ORDER — LACTOBACILLUS RHAMNOSUS GG 10B CELL
1 CAPSULE ORAL
Qty: 30 CAP
Start: 2019-07-16

## 2019-07-15 RX ORDER — FUROSEMIDE 10 MG/ML
20 INJECTION INTRAMUSCULAR; INTRAVENOUS
Status: DISCONTINUED | OUTPATIENT
Start: 2019-07-15 | End: 2019-07-15

## 2019-07-15 RX ORDER — POTASSIUM CHLORIDE 20 MEQ/1
20 TABLET, EXTENDED RELEASE ORAL DAILY
Qty: 7 TAB | Refills: 0 | Status: SHIPPED | OUTPATIENT
Start: 2019-07-15 | End: 2019-07-22

## 2019-07-15 RX ORDER — POTASSIUM CHLORIDE 20 MEQ/1
20 TABLET, EXTENDED RELEASE ORAL ONCE
Status: COMPLETED | OUTPATIENT
Start: 2019-07-15 | End: 2019-07-15

## 2019-07-15 RX ORDER — DOXYCYCLINE 100 MG/1
100 TABLET ORAL EVERY 12 HOURS
Qty: 4 TAB | Refills: 0
Start: 2019-07-15 | End: 2019-07-17

## 2019-07-15 RX ORDER — FUROSEMIDE 20 MG/1
20 TABLET ORAL DAILY
Qty: 7 TAB | Refills: 0 | Status: SHIPPED | OUTPATIENT
Start: 2019-07-15 | End: 2019-07-22

## 2019-07-15 RX ORDER — ALBUTEROL SULFATE 90 UG/1
2 AEROSOL, METERED RESPIRATORY (INHALATION) EVERY 6 HOURS PRN
Qty: 8.5 G
Start: 2019-07-15

## 2019-07-15 RX ORDER — POTASSIUM CHLORIDE 20 MEQ/1
40 TABLET, EXTENDED RELEASE ORAL DAILY
Status: DISCONTINUED | OUTPATIENT
Start: 2019-07-16 | End: 2019-07-16 | Stop reason: HOSPADM

## 2019-07-15 RX ORDER — AMOXICILLIN 250 MG
2 CAPSULE ORAL DAILY
Qty: 60 TAB
Start: 2019-07-15

## 2019-07-15 RX ADMIN — AMOXICILLIN AND CLAVULANATE POTASSIUM 1 TABLET: 875; 125 TABLET, FILM COATED ORAL at 05:48

## 2019-07-15 RX ADMIN — POTASSIUM CHLORIDE 20 MEQ: 20 TABLET, EXTENDED RELEASE ORAL at 11:22

## 2019-07-15 RX ADMIN — CLOPIDOGREL BISULFATE 75 MG: 75 TABLET ORAL at 05:47

## 2019-07-15 RX ADMIN — SENNOSIDES, DOCUSATE SODIUM 2 TABLET: 50; 8.6 TABLET, FILM COATED ORAL at 05:48

## 2019-07-15 RX ADMIN — Medication 1 CAPSULE: at 07:51

## 2019-07-15 RX ADMIN — POTASSIUM CHLORIDE 20 MEQ: 1500 TABLET, EXTENDED RELEASE ORAL at 05:48

## 2019-07-15 RX ADMIN — FUROSEMIDE 20 MG: 10 INJECTION, SOLUTION INTRAMUSCULAR; INTRAVENOUS at 05:48

## 2019-07-15 RX ADMIN — DOXYCYCLINE 100 MG: 100 TABLET, FILM COATED ORAL at 17:13

## 2019-07-15 RX ADMIN — DOXYCYCLINE 100 MG: 100 TABLET, FILM COATED ORAL at 05:48

## 2019-07-15 RX ADMIN — ACETAMINOPHEN 650 MG: 325 TABLET, FILM COATED ORAL at 23:51

## 2019-07-15 RX ADMIN — AMOXICILLIN AND CLAVULANATE POTASSIUM 1 TABLET: 875; 125 TABLET, FILM COATED ORAL at 17:13

## 2019-07-15 RX ADMIN — AMLODIPINE BESYLATE 10 MG: 10 TABLET ORAL at 05:48

## 2019-07-15 RX ADMIN — ENOXAPARIN SODIUM 40 MG: 100 INJECTION SUBCUTANEOUS at 17:13

## 2019-07-15 NOTE — DISCHARGE PLANNING
Agency/Facility Name: [facility]  Spoke To: Reception  Outcome: Saurabh out of the office. Left message with reception regarding transportation plans.

## 2019-07-15 NOTE — DISCHARGE PLANNING
Agency/Facility Name: [facility]  Spoke To: Nora  Outcome: May be able to do a transport tomorrow if extra mileage is approved by  supervisor. Quoted cost for our [facility] van is $1,104.00 and for another van it is $1,590.00. LSW notified.

## 2019-07-15 NOTE — CARE PLAN
Problem: Pain Management  Goal: Pain level will decrease to patient's comfort goal  Outcome: PROGRESSING AS EXPECTED      Problem: Mobility  Goal: Risk for activity intolerance will decrease  Outcome: PROGRESSING SLOWER THAN EXPECTED      Problem: Skin Integrity  Goal: Risk for impaired skin integrity will decrease  Outcome: PROGRESSING SLOWER THAN EXPECTED

## 2019-07-15 NOTE — DISCHARGE PLANNING
Anticipated Discharge Disposition: [facility]    Action: DAVIDW spoke wit Pt son who stated he will be here to  the Pt tomorrow between 5:30-6 am. JAGUAR tiger Text the doctor informing her of her time.    Barriers to Discharge: none    Plan: DC early tomorrow morning

## 2019-07-15 NOTE — DISCHARGE SUMMARY
Discharge Summary    CHIEF COMPLAINT ON ADMISSION  Chief Complaint   Patient presents with   • Wound Infection       Reason for Admission  EMS     CODE STATUS  Full Code    HPI & HOSPITAL COURSE  This is a 85 y.o. female here with LLE erythema, cellulitis from chronic ulcer medial ankle.   Ms. Mata is an 85-year-old female who [facility] [place] for increasing left lower extremity erythema, edema, and pain, which was accompanied by increasing fatigue.  At the [facility] hospital she was diagnosed with cellulitis and lymphangitis, and given IV clindamycin and azithromycin prior to transfer here.  She does have a past medical history of moderate peripheral arterial disease and groin stents, and is usually followed by Dr. Walton of vascular surgery at a facility in [place].  She also has a chronic ulceration in that leg and has had previous MRSA infections.  Given the above findings there was concern for early necrotizing fasciitis so orthopedic surgery and vascular were both consulted.  Dr. Hollis evaluated the patient and felt there was no current indication for vascular intervention.  He recommended continuation of aggressive IV antibiotic therapy.  Consultation by orthopedic surgery is still pending.  Patient is currently receiving IV unasyn and vancomycin.  An x-ray of the ankle showed soft tissue swelling only.  Blood cultures are in process.     7/9:  BCs negative.  Left leg markings of cellulitis improved on thigh, however remain erythematous lower leg and distal anterior thigh.  Re-ordered wound cultures since discharge seen on b/l dorsal foot ulcers.  D/w bedside RN.  C/o muscle cramping bilateral legs, started on flexeril PRN.  Mild to moderate bilateral arterial stenosis seen on arterial u/s.  Ordered u/s venous to rule out dVT.Ordered SNF since both PT/OT recommending transitional care. dc'd IVFs 75/hr since eating well.  Hg stable at 10 to 9.4.   Ordered iron studies for a.m.  7/10:  Wound culture with gram positive rods, ID and ss pending.  Improvement seen in left thigh, remains with erythema lower left leg.  Iron <10, replacement with IV dextran.   Added norco prn pain, patient states only getting tylenol prn.  dc'd oxycodone and iv dilaudid.   7/11:  Cellulitis looks remarkably improved.  WC still pending ID and SS.  Changed vancomycin to doxycycline.  Continue unasyn until cultures results.  U/s negative for DVT.  Dc isolation since no evidence for MRSA on wound culture.   7/12:  Improving cellulitis, left foot still too painful to bear weight per patient, tender to touch diffusely throughout, no ischemia noted, normal cap refill, warm to touch.  Had MESERET this a.m. With increase in O2 from 2 to 3 LPM NC.  Ordered cxr:  Pulmonary edema noted.  dc'd unasyn IV, changed to augmentin po bid and doxy to continue.  RT protocol, albuterol prn for wheeze, lasix 20mg IV daily started.  No prior home O2 use.  7/13:  Breathing better, on RA s/p lasix IV diuresis.  Echo pending.  Left leg cellulitis improving daily, less erythema noted, wound culture no growth.  7/14:  Swollen left foot noted on exam, while asleep drop in O2 to 84%, but back to 90% when awake.  Echo wnl.  Ordered lasix 20mg IV daily.  No wheeze.    The patient continued to have Left foot edema, mild hypoxia while sleeping, dc'd norco and continue po lasix and K for 1 week to resolve likely fluid overload from IV vanco and IV unasyn x5 days.  She has been doing well on po doxy and augmentin and will finish 10 days total antibiotics.  Wound culture no growth, but taken well into the course of antibiotics.  BCs remained negative x2.  No elevated wbc, normal renal function and echo eF 65%.  No further wheeze noted on exam.  She will need continued wound care to her bilateral medial ankle ulcerations, likely source of her left leg cellulitis.  Of note, patient's desaturation with sleep did not occur until  start of Norco, therefore tylenol only is recommended.  ISS q 1 hour while awake.    PT/OT rec SNF.  SNF order placed.  Moved from [place] to [place], [place] to be with her son. No prior home O2.  Plan for transfer to SNF 7/15.    Therefore, she is discharged in good and stable condition to skilled nursing facility.    The patient met 2-midnight criteria for an inpatient stay at the time of discharge.      FOLLOW UP ITEMS POST DISCHARGE  Follow up with Dr. Heaton in 3 weeks for recheck.    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 *      FOLLOW UP  No future appointments.  [facility]  [address] [id number] [phone]          MEDICATIONS ON DISCHARGE     Medication List      START taking these medications      Instructions   acetaminophen 325 MG Tabs  Commonly known as:  TYLENOL   Take 2 Tabs by mouth every 6 hours as needed (Mild Pain; (Pain scale 1-3); Temp greater than 100.5 F).  Dose:  650 mg     albuterol 108 (90 Base) MCG/ACT Aers inhalation aerosol   Inhale 2 Puffs by mouth every 6 hours as needed for Shortness of Breath.  Dose:  2 Puff     amoxicillin-clavulanate 875-125 MG Tabs  Commonly known as:  AUGMENTIN   Take 1 Tab by mouth every 12 hours for 2 days.  Dose:  1 Tab     doxycycline monohydrate 100 MG tablet  Commonly known as:  ADOXA   Take 1 Tab by mouth every 12 hours for 2 days.  Dose:  100 mg     enoxaparin 40 MG/0.4ML Soln inj  Commonly known as:  LOVENOX   Inject 40 mg as instructed every day.  Dose:  40 mg     furosemide 20 MG Tabs  Commonly known as:  LASIX   Take 1 Tab by mouth every day for 7 days.  Dose:   20 mg     lactobacillus rhamnosus Caps capsule  Start taking on:  7/16/2019   Take 1 Cap by mouth every morning with breakfast.  Dose:  1 Cap     potassium chloride SA 20 MEQ Tbcr  Commonly known as:  Kdur   Take 1 Tab by mouth every day for 7 days.  Dose:  20 mEq     senna-docusate 8.6-50 MG Tabs  Commonly known as:  PERICOLACE or SENOKOT S   Take 2 Tabs by mouth every day.  Dose:  2 Tab        CONTINUE taking these medications      Instructions   amLODIPine 10 MG Tabs  Commonly known as:  NORVASC   Take 10 mg by mouth every day.  Dose:  10 mg     clopidogrel 75 MG Tabs  Commonly known as:  PLAVIX   Take 75 mg by mouth every day.  Dose:  75 mg            Allergies  Allergies   Allergen Reactions   • Cephalexin [Keflex]      Pt unsure of reaction      • Lemon Flavor    • Lime Flavor [Creme De Menthe]    • Pineapple    • Strawberry    • Sulfa Drugs      Pt unsure of reaction    • Sulfamethoxazole W-Trimethoprim      Pt unsure of reaction        DIET  Orders Placed This Encounter   Procedures   • Diet Order Regular     Standing Status:   Standing     Number of Occurrences:   1     Order Specific Question:   Diet:     Answer:   Regular [1]       ACTIVITY  As tolerated and directed by skilled nursing.  Weight bearing as tolerated    LINES, DRAINS, AND WOUNDS  This is an automated list. Peripheral IVs will be removed prior to discharge.  Peripheral IV 07/10/19 22 G Left Forearm (Active)   Site Assessment Clean;Dry;Intact 7/15/2019  8:00 AM   Dressing Type Transparent 7/15/2019  8:00 AM   Line Status Saline locked 7/15/2019  8:00 AM   Dressing Status Dry;Intact;Old drainage 7/15/2019  8:00 AM   Dressing Intervention N/A 7/14/2019  9:00 PM   Date Secondary Tubing Changed 07/12/19 7/12/2019 12:00 AM   NEXT Primary Tubing Change  07/13/19 7/12/2019 12:00 AM   NEXT Secondary Tubing Change  07/13/19 7/12/2019 12:00 AM   Infiltration Grading ([facility]) 0 7/15/2019  8:00 AM   Phlebitis Scale ([facility] Only) 0 7/15/2019  8:00  AM       Wound 07/07/19 Arterial Ulcer Ankle left medial (Active)   Site Assessment CHARU 7/15/2019  7:59 AM   Rula-wound Assessment CHARU 7/15/2019  7:59 AM   Margins CHARU 7/15/2019  7:59 AM   Wound Length (cm) 2 cm 7/8/2019  4:00 PM   Wound Width (cm) 1.8 cm 7/8/2019  4:00 PM   Wound Depth (cm) 0.3 cm 7/8/2019  4:00 PM   Wound Surface Area (cm^2) 3.6 cm^2 7/8/2019  4:00 PM   Tunneling 0 cm 7/8/2019  4:00 PM   Undermining 0 cm 7/8/2019  4:00 PM   Closure CHARU 7/15/2019  7:59 AM   Drainage Amount None 7/15/2019  7:59 AM   Drainage Description CHARU 7/14/2019  8:56 PM   Non-staged Wound Description Partial thickness 7/14/2019  8:56 PM   Treatments Cleansed;Other (Comment) 7/14/2019 10:46 AM   Cleansing Normal Saline Irrigation 7/14/2019 10:46 AM   Periwound Protectant Not Applicable 7/13/2019  8:00 AM   Dressing Options Honey Alginate 7/14/2019 10:46 AM   Dressing Cleansing/Solutions Other (Comments) 7/11/2019  9:45 AM   Dressing Changed Changed 7/14/2019 10:46 AM   Dressing Status Dry;Intact;Old drainage 7/15/2019  7:59 AM   Dressing Change Frequency Every 48 hrs 7/15/2019  7:59 AM   NEXT Dressing Change  07/16/19 7/14/2019  8:56 PM   NEXT Weekly Photo (Inpatient Only) 07/10/19 7/8/2019  4:00 PM   WOUND NURSE ONLY - Odor None 7/8/2019  4:00 PM   WOUND NURSE ONLY - Exposed Structures None 7/8/2019  4:00 PM   WOUND NURSE ONLY - Tissue Type and Percentage 80% pink, 20% yellow 7/8/2019  4:00 PM   WOUND NURSE ONLY - Time Spent with Patient (mins) 50 7/8/2019  4:00 PM       Wound 07/07/19 Arterial Ulcer Foot right, proximal, lateral (Active)   Site Assessment [facility] 7/15/2019  7:59 AM   Rula-wound Assessment [facility] 7/15/2019  7:59 AM   Margins [facility] 7/14/2019  8:56 PM   Wound Length (cm) 2 cm 7/8/2019  4:00 PM   Wound Width (cm) 1.5 cm 7/8/2019  4:00 PM   Wound Depth (cm) 0.1 cm 7/8/2019  4:00 PM   Wound Surface Area (cm^2) 3 cm^2 7/8/2019  4:00 PM   Tunneling 0 cm 7/8/2019  4:00 PM   Undermining 0 cm 7/8/2019  4:00 PM   Closure CHARU  7/15/2019  7:59 AM   Drainage Amount CHARU 7/14/2019  8:56 PM   Drainage Description CHARU 7/14/2019  8:56 PM   Non-staged Wound Description Partial thickness 7/14/2019  8:56 PM   Treatments Cleansed 7/14/2019 10:46 AM   Cleansing Normal Saline Irrigation 7/14/2019 10:46 AM   Periwound Protectant Not Applicable 7/14/2019 10:46 AM   Dressing Options Honey Alginate 7/14/2019 10:46 AM   Dressing Cleansing/Solutions Normal Saline 7/14/2019 10:46 AM   Dressing Changed Changed 7/14/2019 10:46 AM   Dressing Status Clean;Dry;Intact 7/15/2019  7:59 AM   Dressing Change Frequency Every 48 hrs 7/15/2019  7:59 AM   NEXT Dressing Change  07/16/19 7/14/2019  8:56 PM   NEXT Weekly Photo (Inpatient Only) 07/10/19 7/8/2019  4:00 PM   WOUND NURSE ONLY - Odor None 7/8/2019  4:00 PM   WOUND NURSE ONLY - Exposed Structures None 7/8/2019  4:00 PM   WOUND NURSE ONLY - Tissue Type and Percentage 100% red 7/8/2019  4:00 PM       Peripheral IV 07/10/19 22 G Left Forearm (Active)   Site Assessment Clean;Dry;Intact 7/15/2019  8:00 AM   Dressing Type Transparent 7/15/2019  8:00 AM   Line Status Saline locked 7/15/2019  8:00 AM   Dressing Status Dry;Intact;Old drainage 7/15/2019  8:00 AM   Dressing Intervention N/A 7/14/2019  9:00 PM   Date Secondary Tubing Changed 07/12/19 7/12/2019 12:00 AM   NEXT Primary Tubing Change  07/13/19 7/12/2019 12:00 AM   NEXT Secondary Tubing Change  07/13/19 7/12/2019 12:00 AM   Infiltration Grading ([facility], [facility]) 0 7/15/2019  8:00 AM   Phlebitis Scale ([facility] Only) 0 7/15/2019  8:00 AM               MENTAL STATUS ON TRANSFER  Level of Consciousness: Alert  Orientation : Oriented x 4  Speech: Speech Clear    CONSULTATIONS  Dr. Heaton    PROCEDURES    Transthoracic  Echo Report      Echocardiography Laboratory    CONCLUSIONS  Normal chamber sizes. Normal LV and RV systolic function. Normal   diastolic function. LVEF 65% visually. No significant valvular disease.   RVSP is 35 mmHg estimated. No prior study is available  for comparison.     LJ WOOD  Exam Date:         07/13/2019                Vascular Laboratory   CONCLUSIONS   No evidence of superficial or deep venous thrombosis.     LJ WOOD     Exam Date:     07/10/2019 16:12                     19:45    Lower Extremity   Arterial Duplex Report     Vascular Laboratory   CONCLUSIONS   Right.    Stenosis of the origin of the superficial femoral artery-- approximately    50% stenosis.    Stenosis of the superficial femoral artery at the proximal-mid thigh ---   consistent with 50-75% stenosis.    Stenosis of the mid popliteal artery--50-75% stenosis.    Stenosis of the proximal tibio-peroneal trunk artery-- consistent with 50-   75% stenosis.    Stenosis of the proximal posterior tibial artery. Velocities are consistent    with <50% stenosis.    3-Vessel, biphasic runoff to the foot without reversal of flow.     Left.    Biphasic inflow without reversal of flow, consistent with left lower    extremity cellulitis.   Stenosis of the proximal-mid superficial femoral artery--consistent with    50-75% stenosis.    Stenosis of the distal popliteal artery-- consistent with 50-75% stenosis.    Vessel narrowing and associated elevated velocities extend into the origin    of the anterior tibial artery.   Vessel narrowing and elevated velocities are visualized in the anterior    tibial artery from the proximal calf through the proximal-mid calf.    Velocities are consistent with a >50% stenosis.   No flow can be demonstrated in the posterior tibial artery throughout its    length.    2-vessel, monophasic runoff to the foot.     LJ WOOD     Exam Date:     07/07/2019 15:41  LABORATORY  Lab Results   Component Value Date    SODIUM 132 (L) 07/15/2019    POTASSIUM 3.5 (L) 07/15/2019    CHLORIDE 101 07/15/2019    CO2 25 07/15/2019    GLUCOSE 94 07/15/2019    BUN 10 07/15/2019    CREATININE 0.72 07/15/2019        Lab Results   Component Value Date    WBC 7.6 07/15/2019     HEMOGLOBIN 10.4 (L) 07/15/2019    HEMATOCRIT 32.1 (L) 07/15/2019    PLATELETCT 485 (H) 07/15/2019        Total time of the discharge process exceeds 45 minutes.

## 2019-07-15 NOTE — DISCHARGE PLANNING
Agency/Facility Name: [facility]   Spoke To: Fax notification  Outcome: Patient declined; no open beds.

## 2019-07-15 NOTE — DISCHARGE INSTRUCTIONS
Discharge Instructions    Discharged to group home by car with escort. Discharged via wheelchair, hospital escort: Yes.  Special equipment needed: Not Applicable    Be sure to schedule a follow-up appointment with your primary care doctor or any specialists as instructed.     Discharge Plan:   Activity Level: Discussed  Confirmed Follow up Appointment: Patient to Call and Schedule Appointment  Confirmed Symptoms Management: Discussed  Medication Reconciliation Updated: Yes  Pneumococcal Vaccine Administered/Refused: Not given - Patient refused pneumococcal vaccine  Influenza Vaccine Indication: Patient Refuses    I understand that a diet low in cholesterol, fat, and sodium is recommended for good health. Unless I have been given specific instructions below for another diet, I accept this instruction as my diet prescription.       · Is patient discharged on Warfarin / Coumadin?   No     Depression / Suicide Risk    As you are [facility] from [facility], it is important to learn how to keep safe from harming yourself.    Recognize the warning signs:  · Abrupt changes in personality, positive or negative- including increase in energy   · Giving away possessions  · Change in eating patterns- significant weight changes-  positive or negative  · Change in sleeping patterns- unable to sleep or sleeping all the time   · Unwillingness or inability to communicate  · Depression  · Unusual sadness, discouragement and loneliness  · Talk of wanting to die  · Neglect of personal appearance   · Rebelliousness- reckless behavior  · Withdrawal from people/activities they love  · Confusion- inability to concentrate     If you or a loved one observes any of these behaviors or has concerns about self-harm, here's what you can do:  · Talk about it- your feelings and reasons for harming yourself  · Remove any means that you might use to hurt yourself (examples: pills, rope, extension cords, firearm)  · Get professional  help from the community (Mental Health, Substance Abuse, psychological counseling)  · Do not be alone:Call your Safe Contact- someone whom you trust who will be there for you.  · Call your local CRISIS HOTLINE 367-0292 or 757-940-3188  · Call your local Children's Mobile Crisis Response Team [place] (581) 286-1653 or www.latakoo  · Call the toll free National Suicide Prevention Hotlines   · National Suicide Prevention Lifeline 763-513-LCHB (9697)  · National Hope Line Network 800-SUICIDE (868-1344)

## 2019-07-15 NOTE — PROGRESS NOTES
Pt's general c/o of fatigue. Minimal c/o pain. Lasix given, refusing to get up to BSC. O2 sats above 90 when awake, desats into 80's when asleep. BLE drsngs changed. No c/o.

## 2019-07-15 NOTE — DISCHARGE PLANNING
Agency/Facility Name: [facility]  Spoke To: Reception  Outcome: Admissions is not in the office yet today. Left a message and will try again later.

## 2019-07-15 NOTE — DISCHARGE PLANNING
Anticipated Discharge Disposition: [facility]    Action: LSW contacted Pt son and informed him that the Pt was accepted to [facility] and that if they want transport it will cost $1590 or the doctor has cleared family to transport her per [place] Text communication with Dr. Everett. Pt son said he can take the Pt tomorrow. LSW will follow up with son on discharge time.     Barriers to Discharge: None    Plan: DC to [facility]

## 2019-07-16 VITALS
OXYGEN SATURATION: 95 % | BODY MASS INDEX: 23.74 KG/M2 | WEIGHT: 129 LBS | HEART RATE: 65 BPM | TEMPERATURE: 97.8 F | DIASTOLIC BLOOD PRESSURE: 44 MMHG | RESPIRATION RATE: 18 BRPM | SYSTOLIC BLOOD PRESSURE: 121 MMHG | HEIGHT: 62 IN

## 2019-07-16 LAB
ANION GAP SERPL CALC-SCNC: 8 MMOL/L (ref 0–11.9)
BASOPHILS # BLD AUTO: 0.6 % (ref 0–1.8)
BASOPHILS # BLD: 0.04 K/UL (ref 0–0.12)
BUN SERPL-MCNC: 17 MG/DL (ref 8–22)
CALCIUM SERPL-MCNC: 8.9 MG/DL (ref 8.5–10.5)
CHLORIDE SERPL-SCNC: 102 MMOL/L (ref 96–112)
CO2 SERPL-SCNC: 26 MMOL/L (ref 20–33)
CREAT SERPL-MCNC: 0.89 MG/DL (ref 0.5–1.4)
EOSINOPHIL # BLD AUTO: 0.21 K/UL (ref 0–0.51)
EOSINOPHIL NFR BLD: 3.3 % (ref 0–6.9)
ERYTHROCYTE [DISTWIDTH] IN BLOOD BY AUTOMATED COUNT: 46.5 FL (ref 35.9–50)
GLUCOSE SERPL-MCNC: 86 MG/DL (ref 65–99)
HCT VFR BLD AUTO: 34.4 % (ref 37–47)
HGB BLD-MCNC: 10.5 G/DL (ref 12–16)
IMM GRANULOCYTES # BLD AUTO: 0.07 K/UL (ref 0–0.11)
IMM GRANULOCYTES NFR BLD AUTO: 1.1 % (ref 0–0.9)
LYMPHOCYTES # BLD AUTO: 1.44 K/UL (ref 1–4.8)
LYMPHOCYTES NFR BLD: 22.7 % (ref 22–41)
MCH RBC QN AUTO: 25.6 PG (ref 27–33)
MCHC RBC AUTO-ENTMCNC: 30.5 G/DL (ref 33.6–35)
MCV RBC AUTO: 83.9 FL (ref 81.4–97.8)
MONOCYTES # BLD AUTO: 0.73 K/UL (ref 0–0.85)
MONOCYTES NFR BLD AUTO: 11.5 % (ref 0–13.4)
NEUTROPHILS # BLD AUTO: 3.86 K/UL (ref 2–7.15)
NEUTROPHILS NFR BLD: 60.8 % (ref 44–72)
NRBC # BLD AUTO: 0 K/UL
NRBC BLD-RTO: 0 /100 WBC
PLATELET # BLD AUTO: 446 K/UL (ref 164–446)
PMV BLD AUTO: 9.2 FL (ref 9–12.9)
POTASSIUM SERPL-SCNC: 4.1 MMOL/L (ref 3.6–5.5)
RBC # BLD AUTO: 4.1 M/UL (ref 4.2–5.4)
SODIUM SERPL-SCNC: 136 MMOL/L (ref 135–145)
WBC # BLD AUTO: 6.4 K/UL (ref 4.8–10.8)

## 2019-07-16 PROCEDURE — 36415 COLL VENOUS BLD VENIPUNCTURE: CPT

## 2019-07-16 PROCEDURE — 700102 HCHG RX REV CODE 250 W/ 637 OVERRIDE(OP): Performed by: INTERNAL MEDICINE

## 2019-07-16 PROCEDURE — A9270 NON-COVERED ITEM OR SERVICE: HCPCS | Performed by: HOSPITALIST

## 2019-07-16 PROCEDURE — 700102 HCHG RX REV CODE 250 W/ 637 OVERRIDE(OP): Performed by: HOSPITALIST

## 2019-07-16 PROCEDURE — 85025 COMPLETE CBC W/AUTO DIFF WBC: CPT

## 2019-07-16 PROCEDURE — 80048 BASIC METABOLIC PNL TOTAL CA: CPT

## 2019-07-16 PROCEDURE — A9270 NON-COVERED ITEM OR SERVICE: HCPCS | Performed by: INTERNAL MEDICINE

## 2019-07-16 RX ADMIN — AMOXICILLIN AND CLAVULANATE POTASSIUM 1 TABLET: 875; 125 TABLET, FILM COATED ORAL at 04:38

## 2019-07-16 RX ADMIN — AMLODIPINE BESYLATE 10 MG: 10 TABLET ORAL at 04:38

## 2019-07-16 RX ADMIN — CLOPIDOGREL BISULFATE 75 MG: 75 TABLET ORAL at 04:38

## 2019-07-16 RX ADMIN — DOXYCYCLINE 100 MG: 100 TABLET, FILM COATED ORAL at 04:38

## 2019-07-16 NOTE — DOCUMENTATION QUERY
[facility]                                                                       Query Response Note      PATIENT:               LJ WOOD  ACCT #:                  [id number]  MRN:                     [id number]  :                      3/16/1934  ADMIT DATE:       2019 11:45 AM  DISCH DATE:          RESPONDING  PROVIDER #:        [id number]           QUERY TEXT:    Shortness of breath is documented in the Medical Record. Can a diagnosis be provided to support this finding?(includes suspected or probable)    NOTE:  If an appropriate response is not listed below, please respond with a new note.    The patient's Clinical Indicators include:  Per  Progress Note: positive for cough, shortness of breath, wheezing.  Had MESERET this AM w/ increase in O2 from 2 to 3 LPM NC.  No prior home O2 use  Per Vitals Flowsheet:  96% RA,  93% 3L,  87% RA   CXR: small bilateral pleural effusions w/ overlying atelectasis/ consolidation, mild interstitial edema  Risk Factors: acute pulmonary edema  Treatment: supplemental O2, RT protocol  Options provided:   -- Acute respiratory failure with hypoxia   -- Acute respiratory failure with hypercapnia   -- Acute on chronic respiratory failure with hypoxia   -- Acute on chronic respiratory failure with hypercapnia   -- Respiratory Distress   -- Hypoxia   -- Findings of no clinical significance   -- Unable to determine      Query created by: Craig Etienne on 7/15/2019 5:45 AM    RESPONSE TEXT:    Acute respiratory failure with hypoxia          Electronically signed by:  HELEN LEE 7/15/2019 10:22 PM

## 2019-07-16 NOTE — PROGRESS NOTES
Pt escorted of unit via wheelchair with RN and son. Son to transport patient to [facility] in [place] via car. Pt a &O x 4 upon departure no signs or symptoms of distress.

## 2019-07-16 NOTE — DOCUMENTATION QUERY
[facility]                                                                       Query Response Note      PATIENT:               LJ WOOD  ACCT #:                  [id number]  MRN:                     [id number]  :                      3/16/1934  ADMIT DATE:       2019 11:45 AM  DISCH DATE:        2019 5:22 AM  RESPONDING  PROVIDER #:        [id number]           QUERY TEXT:    Na 132 is noted in  Lab Results. Can a diagnosis be specified to support this finding?    NOTE:  If an appropriate response is not listed below, please respond with a new note.    The patient's Clinical Indicators include:   Na 132  Risk Factors: cellulitis, advanced age, htn   Treatment: IV NS  Options provided:   -- Hyponatremia   -- Findings are clinically insignificant   -- Unable to determine      Query created by: Craig Etienne on 2019 9:07 AM    RESPONSE TEXT:    Findings are clinically insignificant          Electronically signed by:  EDGAR ORELLANA 2019 7:14 AM

## 2019-07-16 NOTE — PROGRESS NOTES
VSS. Pt status unchanged. Son here this evening, states will provide transportation at 0500 or 0600. Pt refused getting up and out of bed again today, has used bedpan. No c/o.